# Patient Record
Sex: MALE | Race: WHITE | Employment: OTHER | URBAN - METROPOLITAN AREA
[De-identification: names, ages, dates, MRNs, and addresses within clinical notes are randomized per-mention and may not be internally consistent; named-entity substitution may affect disease eponyms.]

---

## 2017-04-07 ENCOUNTER — OFFICE VISIT (OUTPATIENT)
Dept: PEDIATRICS | Facility: CLINIC | Age: 57
End: 2017-04-07
Payer: COMMERCIAL

## 2017-04-07 VITALS
WEIGHT: 164.5 LBS | HEIGHT: 68 IN | BODY MASS INDEX: 24.93 KG/M2 | SYSTOLIC BLOOD PRESSURE: 108 MMHG | DIASTOLIC BLOOD PRESSURE: 80 MMHG | HEART RATE: 59 BPM | TEMPERATURE: 97.7 F | OXYGEN SATURATION: 98 %

## 2017-04-07 DIAGNOSIS — J30.2 SEASONAL ALLERGIC RHINITIS, UNSPECIFIED ALLERGIC RHINITIS TRIGGER: ICD-10-CM

## 2017-04-07 DIAGNOSIS — G47.09 OTHER INSOMNIA: ICD-10-CM

## 2017-04-07 DIAGNOSIS — F43.20 ADJUSTMENT DISORDER, UNSPECIFIED TYPE: Primary | ICD-10-CM

## 2017-04-07 PROCEDURE — 99214 OFFICE O/P EST MOD 30 MIN: CPT | Performed by: PHYSICIAN ASSISTANT

## 2017-04-07 RX ORDER — ESCITALOPRAM OXALATE 10 MG/1
10 TABLET ORAL DAILY
Qty: 45 TABLET | Refills: 0 | Status: SHIPPED | OUTPATIENT
Start: 2017-04-07 | End: 2017-05-16

## 2017-04-07 RX ORDER — LORATADINE 10 MG/1
10 TABLET ORAL DAILY
Qty: 90 TABLET | Refills: 3 | Status: SHIPPED | OUTPATIENT
Start: 2017-04-07 | End: 2021-06-10

## 2017-04-07 RX ORDER — FLUTICASONE PROPIONATE 50 MCG
1-2 SPRAY, SUSPENSION (ML) NASAL DAILY
Qty: 3 BOTTLE | Refills: 11 | Status: SHIPPED | OUTPATIENT
Start: 2017-04-07

## 2017-04-07 RX ORDER — ESCITALOPRAM OXALATE 20 MG/1
10 TABLET ORAL DAILY
Qty: 15 TABLET | Refills: 0 | Status: SHIPPED | OUTPATIENT
Start: 2017-04-07 | End: 2017-04-07

## 2017-04-07 ASSESSMENT — ANXIETY QUESTIONNAIRES
GAD7 TOTAL SCORE: 3
2. NOT BEING ABLE TO STOP OR CONTROL WORRYING: NOT AT ALL
7. FEELING AFRAID AS IF SOMETHING AWFUL MIGHT HAPPEN: NOT AT ALL
6. BECOMING EASILY ANNOYED OR IRRITABLE: MORE THAN HALF THE DAYS
5. BEING SO RESTLESS THAT IT IS HARD TO SIT STILL: NOT AT ALL
3. WORRYING TOO MUCH ABOUT DIFFERENT THINGS: SEVERAL DAYS
IF YOU CHECKED OFF ANY PROBLEMS ON THIS QUESTIONNAIRE, HOW DIFFICULT HAVE THESE PROBLEMS MADE IT FOR YOU TO DO YOUR WORK, TAKE CARE OF THINGS AT HOME, OR GET ALONG WITH OTHER PEOPLE: SOMEWHAT DIFFICULT
1. FEELING NERVOUS, ANXIOUS, OR ON EDGE: NOT AT ALL

## 2017-04-07 ASSESSMENT — PATIENT HEALTH QUESTIONNAIRE - PHQ9: 5. POOR APPETITE OR OVEREATING: NOT AT ALL

## 2017-04-07 NOTE — PATIENT INSTRUCTIONS
Set up follow up with Dr. Plasencia in 2-4 weeks.      Depression Affects Your Mind and Body  Everyone feels sad or  blue  from time to time for a few days or weeks. Depression is when these feelings don't go away and they interfere with daily life.  Depression is a real illness. It makes you feel sad and helpless. It gets in the way of your life and relationships. It inhibits your ability to think and act. But, with help, you can feel better again.      When I was depressed, I felt awful. I was so tired all the time I could hardly think, but at night I couldn t fall asleep. My head hurt. My stomach hurt. I didn t know what was wrong with me.    Depression affects your whole body  Brain chemicals affect your body as well as your mood. So depression may do more than just make you feel low. You may also feel bad physically. Depression can:    Cause trouble with mental tasks such as remembering, concentrating, or making decisions    Make you feel nervous and jumpy    Cause trouble sleeping. Or you may sleep too much    Change your appetite    Cause headaches, stomachaches, or other aches and pains    Drain your body of energy  Depression and other illness  It is common for people who have chronic health problems to also have depression. It can often be hard to tell which one caused the other. A person might become depressed after finding out they have a health problem. But some studies suggest being depressed may make certain health problems more likely. And some depressed people stop taking care of themselves. This may make them more likely to get sick.    0553-1039 [employer]. [address] [place] [address], [place] [address]. All rights reserved. This information is not intended as a substitute for professional medical care. Always follow your healthcare professional's instructions.        Depression  Depression is one of the most common mental health problems today. It is not just a state of unhappiness or  sadness. It is a true disease. The cause seems to be related to a decrease in chemicals that transmit signals in the brain. Having a family history of depression, alcoholism, or suicide increases the risk. Chronic illness, chronic pain, migraine headaches and high emotional stress also increase the risk.  Depression is something we tend to recognize in others, but may have a hard time seeing in ourselves. It can show in many physical and emotional ways:    Loss of appetite    Over-eating    Not being able to sleep    Sleeping too much    Tiredness not related to physical exertion    Restlessness or irritability    Slowness of movement or speech    Feeling depressed or withdrawn    Loss of interest in things you once enjoyed    Trouble concentrating, poor memory, trouble making decisions    Thoughts of harming or killing oneself, or thoughts that life is not worth living    Low self-esteem  The treatment for depression may include both medicine and psychotherapy. Antidepressants can reduce suffering and can improve the ability to function during the depressed period. Therapy can offer emotional support and help you understand emotional factors that may be causing the depression.  Home care    On-going care and support helps people manage this disease.  Find a healthcare provider and therapist who meet your needs. Seek help when you feel like you may be getting ill.    Be kind to yourself. Make it a point to do things that you enjoy (gardening, walking in nature, going to a movie, etc.). Reward yourself for small successes.    Take care of your physical body. Eat a balanced diet (low in saturated fat and high in fruits and vegetables). Exercise at least 3 times a week for 30 minutes. Even mild-moderate exercise (like brisk walking) can make you feel better.    Avoid alcohol, which can make depression worse.    Take medicine as prescribed.    Tell each of your healthcare providers about all of the prescription drugs,  over-the-counter medicines, vitamins, and supplements you take. Certain supplements interact with medicines and can result in dangerous side effects. Ask your pharmacist when you have questions about drug interactions.    Talk with your family and trusted friends about your feelings and thoughts. Ask them to help you recognize behavior changes early so you can get help and, if needed, medicine can be adjusted.  Follow-up care  Follow up with your healthcare provider, or as advised.  Call 911  Call 911 if you:    Have suicidal thoughts, a suicide plan, and the means to carry out the plan    Have trouble breathing    Are very confused    Feel very drowsy or have trouble awakening    Faint or lose consciousness    Have new chest pain that becomes more severe, lasts longer, or spreads into your shoulder, arm, neck, jaw or back  When to seek medical advice  Call your healthcare provider right away if any of these occur:    Feeling extreme depression, fear, anxiety, or anger toward yourself or others    Feeling out of control    Feeling that you may try to harm yourself or another    Hearing voices that others do not hear    Seeing things that others do not see    Can t sleep or eat for 3 days in a row    Friends or family express concern over your behavior and ask you to seek help    [id number] [employer]. [address] [place] [address]. All rights reserved. This information is not intended as a substitute for professional medical care. Always follow your healthcare professional's instructions.        Depression: Tips to Help Yourself  As your health care providers help treat your depression, you can also help yourself. Keep in mind that your illness affects you emotionally, physically, mentally, and socially. So full recovery will take time. Take care of your body and your soul, and be patient with yourself as you get better.    Be with others  Don t isolate yourself--you ll only feel worse. Try  to be with other people. And take part in fun activities when you can. Go to a movie, ballgame, [employer] service, or social event. Talk openly with people you can trust. And accept help when it s offered.  Keep your perspective    Depression can cloud your judgment. So wait until you feel better before making major life decisions, such as changing jobs, moving, or getting  or .    This illness is not your fault. Don t blame yourself for your depression.    Recovering from depression is a process. Don t be discouraged if it takes some time to feel better.    Depression saps your energy and concentration. So you won t be able to do all the things you used to do. Set small goals and do what you can.  Take care of your body  People with depression often lose the desire to take care of themselves. That only makes their problems worse. During treatment and afterward, make a point to:    Exercise. It s a great way to take care of your body. And studies have shown that exercise helps fight depression.    Avoid drugs and alcohol. These may ease the pain in the short term. But they ll only make your problems worse in the long run.    Get relief from stress. Ask your healthcare provider for relaxation exercises and techniques to help relieve stress.    Eat right. A balanced and healthy diet helps keep your body healthy.    [id number] [employer]. [address] [place] [address], [place] [address]. All rights reserved. This information is not intended as a substitute for professional medical care. Always follow your healthcare professional's instructions.        Patient Education    Escitalopram Oral solution    Escitalopram Oral tablet  Escitalopram Oral tablet  What is this medicine?  ESCITALOPRAM (es sye YURI oh pram) is used to treat depression and certain types of anxiety.  This medicine may be used for other purposes; ask your health care provider or pharmacist if you have questions.  What should I tell  my health care provider before I take this medicine?  They need to know if you have any of these conditions:    bipolar disorder or a family history of bipolar disorder    diabetes    glaucoma    heart disease    kidney or liver disease    receiving electroconvulsive therapy    seizures (convulsions)    suicidal thoughts, plans, or attempt by you or a family member    an unusual or allergic reaction to escitalopram, the related drug citalopram, other medicines, foods, dyes, or preservatives    pregnant or trying to become pregnant    breast-feeding  How should I use this medicine?  Take this medicine by mouth with a glass of water. Follow the directions on the prescription label. You can take it with or without food. If it upsets your stomach, take it with food. Take your medicine at regular intervals. Do not take it more often than directed. Do not stop taking this medicine suddenly except upon the advice of your doctor. Stopping this medicine too quickly may cause serious side effects or your condition may worsen.  A special MedGuide will be given to you by the pharmacist with each prescription and refill. Be sure to read this information carefully each time.  Talk to your pediatrician regarding the use of this medicine in children. Special care may be needed.  Overdosage: If you think you have taken too much of this medicine contact a poison control center or emergency room at once.  NOTE: This medicine is only for you. Do not share this medicine with others.  What if I miss a dose?  If you miss a dose, take it as soon as you can. If it is almost time for your next dose, take only that dose. Do not take double or extra doses.  What may interact with this medicine?  Do not take this medicine with any of the following medications:    certain medicines for fungal infections like fluconazole, itraconazole, ketoconazole, posaconazole,  voriconazole    cisapride    citalopram    dofetilide    dronedarone    linezolid    MAOIs like Carbex, Eldepryl, Marplan, Nardil, and Parnate    methylene blue (injected into a vein)    pimozide    thioridazine    ziprasidone  This medicine may also interact with the following medications:    alcohol    aspirin and aspirin-like medicines    carbamazepine    certain medicines for depression, anxiety, or psychotic disturbances    certain medicines for migraine headache like almotriptan, eletriptan, frovatriptan, naratriptan, rizatriptan, sumatriptan, zolmitriptan    certain medicines for sleep    certain medicines that treat or prevent blood clots like warfarin, enoxaparin, dalteparin    cimetidine    diuretics    fentanyl    furazolidone    isoniazid    lithium    metoprolol    NSAIDs, medicines for pain and inflammation, like ibuprofen or naproxen    other medicines that prolong the QT interval (cause an abnormal heart rhythm)    procarbazine    rasagiline    supplements like Mike's wort, kava kava, valerian    tramadol    tryptophan  This list may not describe all possible interactions. Give your health care provider a list of all the medicines, herbs, non-prescription drugs, or dietary supplements you use. Also tell them if you smoke, drink alcohol, or use illegal drugs. Some items may interact with your medicine.  What should I watch for while using this medicine?  Tell your doctor if your symptoms do not get better or if they get worse. Visit your doctor or health care professional for regular checks on your progress. Because it may take several weeks to see the full effects of this medicine, it is important to continue your treatment as prescribed by your doctor.  Patients and their families should watch out for new or worsening thoughts of suicide or depression. Also watch out for sudden changes in feelings such as feeling anxious, agitated, panicky, irritable, hostile, aggressive, impulsive, severely  restless, overly excited and hyperactive, or not being able to sleep. If this happens, especially at the beginning of treatment or after a change in dose, call your health care professional.  You may get drowsy or dizzy. Do not drive, use machinery, or do anything that needs mental alertness until you know how this medicine affects you. Do not stand or sit up quickly, especially if you are an older patient. This reduces the risk of dizzy or fainting spells. Alcohol may interfere with the effect of this medicine. Avoid alcoholic drinks.  Your mouth may get dry. Chewing sugarless gum or sucking hard candy, and drinking plenty of water may help. Contact your doctor if the problem does not go away or is severe.  What side effects may I notice from receiving this medicine?  Side effects that you should report to your doctor or health care professional as soon as possible:    allergic reactions like skin rash, itching or hives, swelling of the face, lips, or tongue    confusion    feeling faint or lightheaded, falls    fast talking and excited feelings or actions that are out of control    hallucination, loss of contact with reality    seizures    suicidal thoughts or other mood changes    unusual bleeding or bruising  Side effects that usually do not require medical attention (report to your doctor or health care professional if they continue or are bothersome):    blurred vision    changes in appetite    change in sex drive or performance    headache    increased sweating    nausea  This list may not describe all possible side effects. Call your doctor for medical advice about side effects. You may report side effects to FDA at 4-041-FDA-1596.  Where should I keep my medicine?  Keep out of reach of children.  Store at room temperature between 15 and 30 degrees C (59 and 86 degrees F). Throw away any unused medicine after the expiration date.  NOTE:This sheet is a summary. It may not cover all possible information. If  you have questions about this medicine, talk to your doctor, pharmacist, or health care provider. Copyright  2016 [employer]

## 2017-04-07 NOTE — MR AVS SNAPSHOT
After Visit Summary   4/7/2017    Miguel Friend    MRN: [id number]           Patient Information     Date Of Birth          1960        Visit Information        Provider Department      4/7/2017 12:40 PM Brian Iqbal PA-C [facility]        Today's Diagnoses     Major depressive disorder, single episode, mild (H)    -  1    Seasonal allergic rhinitis, unspecified allergic rhinitis trigger          Care Instructions      Set up follow up with Dr. Plasencia in 2-4 weeks.      Depression Affects Your Mind and Body  Everyone feels sad or  blue  from time to time for a few days or weeks. Depression is when these feelings don't go away and they interfere with daily life.  Depression is a real illness. It makes you feel sad and helpless. It gets in the way of your life and relationships. It inhibits your ability to think and act. But, with help, you can feel better again.      When I was depressed, I felt awful. I was so tired all the time I could hardly think, but at night I couldn t fall asleep. My head hurt. My stomach hurt. I didn t know what was wrong with me.    Depression affects your whole body  Brain chemicals affect your body as well as your mood. So depression may do more than just make you feel low. You may also feel bad physically. Depression can:    Cause trouble with mental tasks such as remembering, concentrating, or making decisions    Make you feel nervous and jumpy    Cause trouble sleeping. Or you may sleep too much    Change your appetite    Cause headaches, stomachaches, or other aches and pains    Drain your body of energy  Depression and other illness  It is common for people who have chronic health problems to also have depression. It can often be hard to tell which one caused the other. A person might become depressed after finding out they have a health problem. But some studies suggest being depressed may make certain health problems more likely. And  some depressed people stop taking care of themselves. This may make them more likely to get sick.    7663-3318 [employer]. [address] [place] [address], [place] [address]. All rights reserved. This information is not intended as a substitute for professional medical care. Always follow your healthcare professional's instructions.        Depression  Depression is one of the most common mental health problems today. It is not just a state of unhappiness or sadness. It is a true disease. The cause seems to be related to a decrease in chemicals that transmit signals in the brain. Having a family history of depression, alcoholism, or suicide increases the risk. Chronic illness, chronic pain, migraine headaches and high emotional stress also increase the risk.  Depression is something we tend to recognize in others, but may have a hard time seeing in ourselves. It can show in many physical and emotional ways:    Loss of appetite    Over-eating    Not being able to sleep    Sleeping too much    Tiredness not related to physical exertion    Restlessness or irritability    Slowness of movement or speech    Feeling depressed or withdrawn    Loss of interest in things you once enjoyed    Trouble concentrating, poor memory, trouble making decisions    Thoughts of harming or killing oneself, or thoughts that life is not worth living    Low self-esteem  The treatment for depression may include both medicine and psychotherapy. Antidepressants can reduce suffering and can improve the ability to function during the depressed period. Therapy can offer emotional support and help you understand emotional factors that may be causing the depression.  Home care    On-going care and support helps people manage this disease.  Find a healthcare provider and therapist who meet your needs. Seek help when you feel like you may be getting ill.    Be kind to yourself. Make it a point to do things that you enjoy (gardening, walking in  nature, going to a movie, etc.). Reward yourself for small successes.    Take care of your physical body. Eat a balanced diet (low in saturated fat and high in fruits and vegetables). Exercise at least 3 times a week for 30 minutes. Even mild-moderate exercise (like brisk walking) can make you feel better.    Avoid alcohol, which can make depression worse.    Take medicine as prescribed.    Tell each of your healthcare providers about all of the prescription drugs, over-the-counter medicines, vitamins, and supplements you take. Certain supplements interact with medicines and can result in dangerous side effects. Ask your pharmacist when you have questions about drug interactions.    Talk with your family and trusted friends about your feelings and thoughts. Ask them to help you recognize behavior changes early so you can get help and, if needed, medicine can be adjusted.  Follow-up care  Follow up with your healthcare provider, or as advised.  Call 911  Call 911 if you:    Have suicidal thoughts, a suicide plan, and the means to carry out the plan    Have trouble breathing    Are very confused    Feel very drowsy or have trouble awakening    Faint or lose consciousness    Have new chest pain that becomes more severe, lasts longer, or spreads into your shoulder, arm, neck, jaw or back  When to seek medical advice  Call your healthcare provider right away if any of these occur:    Feeling extreme depression, fear, anxiety, or anger toward yourself or others    Feeling out of control    Feeling that you may try to harm yourself or another    Hearing voices that others do not hear    Seeing things that others do not see    Can t sleep or eat for 3 days in a row    Friends or family express concern over your behavior and ask you to seek help    9805-1291 [employer]. [address] [place] [address], [place] [address]. All rights reserved. This information is not intended as a substitute for professional medical  care. Always follow your healthcare professional's instructions.        Depression: Tips to Help Yourself  As your health care providers help treat your depression, you can also help yourself. Keep in mind that your illness affects you emotionally, physically, mentally, and socially. So full recovery will take time. Take care of your body and your soul, and be patient with yourself as you get better.    Be with others  Don t isolate yourself--you ll only feel worse. Try to be with other people. And take part in fun activities when you can. Go to a movie, Soapboxgame, Spiritism service, or social event. Talk openly with people you can trust. And accept help when it s offered.  Keep your perspective    Depression can cloud your judgment. So wait until you feel better before making major life decisions, such as changing jobs, moving, or getting  or .    This illness is not your fault. Don t blame yourself for your depression.    Recovering from depression is a process. Don t be discouraged if it takes some time to feel better.    Depression saps your energy and concentration. So you won t be able to do all the things you used to do. Set small goals and do what you can.  Take care of your body  People with depression often lose the desire to take care of themselves. That only makes their problems worse. During treatment and afterward, make a point to:    Exercise. It s a great way to take care of your body. And studies have shown that exercise helps fight depression.    Avoid drugs and alcohol. These may ease the pain in the short term. But they ll only make your problems worse in the long run.    Get relief from stress. Ask your healthcare provider for relaxation exercises and techniques to help relieve stress.    Eat right. A balanced and healthy diet helps keep your body healthy.    8578-3558 [employer]. [address] [place] [address], [place] [address]. All rights reserved. This information is not  intended as a substitute for professional medical care. Always follow your healthcare professional's instructions.        Patient Education    Escitalopram Oral solution    Escitalopram Oral tablet  Escitalopram Oral tablet  What is this medicine?  ESCITALOPRAM (es sye YURI oh pram) is used to treat depression and certain types of anxiety.  This medicine may be used for other purposes; ask your health care provider or pharmacist if you have questions.  What should I tell my health care provider before I take this medicine?  They need to know if you have any of these conditions:    bipolar disorder or a family history of bipolar disorder    diabetes    glaucoma    heart disease    kidney or liver disease    receiving electroconvulsive therapy    seizures (convulsions)    suicidal thoughts, plans, or attempt by you or a family member    an unusual or allergic reaction to escitalopram, the related drug citalopram, other medicines, foods, dyes, or preservatives    pregnant or trying to become pregnant    breast-feeding  How should I use this medicine?  Take this medicine by mouth with a glass of water. Follow the directions on the prescription label. You can take it with or without food. If it upsets your stomach, take it with food. Take your medicine at regular intervals. Do not take it more often than directed. Do not stop taking this medicine suddenly except upon the advice of your doctor. Stopping this medicine too quickly may cause serious side effects or your condition may worsen.  A special MedGuide will be given to you by the pharmacist with each prescription and refill. Be sure to read this information carefully each time.  Talk to your pediatrician regarding the use of this medicine in children. Special care may be needed.  Overdosage: If you think you have taken too much of this medicine contact a poison control center or emergency room at once.  NOTE: This medicine is only for you. Do not share this medicine  with others.  What if I miss a dose?  If you miss a dose, take it as soon as you can. If it is almost time for your next dose, take only that dose. Do not take double or extra doses.  What may interact with this medicine?  Do not take this medicine with any of the following medications:    certain medicines for fungal infections like fluconazole, itraconazole, ketoconazole, posaconazole, voriconazole    cisapride    citalopram    dofetilide    dronedarone    linezolid    MAOIs like Carbex, Eldepryl, Marplan, Nardil, and Parnate    methylene blue (injected into a vein)    pimozide    thioridazine    ziprasidone  This medicine may also interact with the following medications:    alcohol    aspirin and aspirin-like medicines    carbamazepine    certain medicines for depression, anxiety, or psychotic disturbances    certain medicines for migraine headache like almotriptan, eletriptan, frovatriptan, naratriptan, rizatriptan, sumatriptan, zolmitriptan    certain medicines for sleep    certain medicines that treat or prevent blood clots like warfarin, enoxaparin, dalteparin    cimetidine    diuretics    fentanyl    furazolidone    isoniazid    lithium    metoprolol    NSAIDs, medicines for pain and inflammation, like ibuprofen or naproxen    other medicines that prolong the QT interval (cause an abnormal heart rhythm)    procarbazine    rasagiline    supplements like Mike's wort, kava kava, valerian    tramadol    tryptophan  This list may not describe all possible interactions. Give your health care provider a list of all the medicines, herbs, non-prescription drugs, or dietary supplements you use. Also tell them if you smoke, drink alcohol, or use illegal drugs. Some items may interact with your medicine.  What should I watch for while using this medicine?  Tell your doctor if your symptoms do not get better or if they get worse. Visit your doctor or health care professional for regular checks on your progress.  Because it may take several weeks to see the full effects of this medicine, it is important to continue your treatment as prescribed by your doctor.  Patients and their families should watch out for new or worsening thoughts of suicide or depression. Also watch out for sudden changes in feelings such as feeling anxious, agitated, panicky, irritable, hostile, aggressive, impulsive, severely restless, overly excited and hyperactive, or not being able to sleep. If this happens, especially at the beginning of treatment or after a change in dose, call your health care professional.  You may get drowsy or dizzy. Do not drive, use machinery, or do anything that needs mental alertness until you know how this medicine affects you. Do not stand or sit up quickly, especially if you are an older patient. This reduces the risk of dizzy or fainting spells. Alcohol may interfere with the effect of this medicine. Avoid alcoholic drinks.  Your mouth may get dry. Chewing sugarless gum or sucking hard candy, and drinking plenty of water may help. Contact your doctor if the problem does not go away or is severe.  What side effects may I notice from receiving this medicine?  Side effects that you should report to your doctor or health care professional as soon as possible:    allergic reactions like skin rash, itching or hives, swelling of the face, lips, or tongue    confusion    feeling faint or lightheaded, falls    fast talking and excited feelings or actions that are out of control    hallucination, loss of contact with reality    seizures    suicidal thoughts or other mood changes    unusual bleeding or bruising  Side effects that usually do not require medical attention (report to your doctor or health care professional if they continue or are bothersome):    blurred vision    changes in appetite    change in sex drive or performance    headache    increased sweating    nausea  This list may not describe all possible side  "effects. Call your doctor for medical advice about side effects. You may report side effects to FDA at 7-731-DWO-4217.  Where should I keep my medicine?  Keep out of reach of children.  Store at room temperature between 15 and 30 degrees C (59 and 86 degrees F). Throw away any unused medicine after the expiration date.  NOTE:This sheet is a summary. It may not cover all possible information. If you have questions about this medicine, talk to your doctor, pharmacist, or health care provider. Copyright  2016 [employer]              Follow-ups after your visit        Who to contact     If you have questions or need follow up information about today's clinic visit or your schedule please contact [facility]] directly at [phone].  Normal or non-critical lab and imaging results will be communicated to you by [employer], letter or phone within 4 business days after the clinic has received the results. If you do not hear from us within 7 days, please contact the clinic through [employer] or phone. If you have a critical or abnormal lab result, we will notify you by phone as soon as possible.  Submit refill requests through Alerts or call your pharmacy and they will forward the refill request to us. Please allow 3 business days for your refill to be completed.          Additional Information About Your Visit        Alerts Information     Alerts lets you send messages to your doctor, view your test results, renew your prescriptions, schedule appointments and more. To sign up, go to [email or web address] . Click on \"Log in\" on the left side of the screen, which will take you to the Welcome page. Then click on \"Sign up Now\" on the right side of the page.     You will be asked to enter the access code listed below, as well as some personal information. Please follow the directions to create your username and password.     Your access code is: [email or web address]  Expires: 7/6/2017  1:48 PM     Your access code will " " in 90 days. If you need help or a new code, please call your [facility] clinic or [phone].        Care EveryWhere ID     This is your Care EveryWhere ID. This could be used by other organizations to access your [facility] medical records  [id number]        Your Vitals Were     Pulse Temperature Height Pulse Oximetry BMI (Body Mass Index)       59 97.7  F (36.5  C) (Oral) 5' 8\" (1.727 m) 98% 25.01 kg/m2        Blood Pressure from Last 3 Encounters:   17 108/80   11/15/16 124/70   04/17/15 107/90    Weight from Last 3 Encounters:   17 164 lb 8 oz (74.6 kg)   11/15/16 163 lb (73.9 kg)   04/17/15 162 lb (73.5 kg)              Today, you had the following     No orders found for display         Today's Medication Changes          These changes are accurate as of: 17  1:48 PM.  If you have any questions, ask your nurse or doctor.               Start taking these medicines.        Dose/Directions    escitalopram 20 MG tablet   Commonly known as:  LEXAPRO   Used for:  Major depressive disorder, single episode, mild (H)   Started by:  Brian Iqbal PA-C        Dose:  10 mg   Take 0.5 tablets (10 mg) by mouth daily   Quantity:  15 tablet   Refills:  0       fluticasone 50 MCG/ACT spray   Commonly known as:  FLONASE   Used for:  Seasonal allergic rhinitis, unspecified allergic rhinitis trigger   Started by:  Brian Iqbal PA-C        Dose:  1-2 spray   Spray 1-2 sprays into both nostrils daily   Quantity:  3 Bottle   Refills:  11       loratadine 10 MG tablet   Commonly known as:  CLARITIN   Used for:  Seasonal allergic rhinitis, unspecified allergic rhinitis trigger   Started by:  Brian Iqbal PA-C        Dose:  10 mg   Take 1 tablet (10 mg) by mouth daily   Quantity:  90 tablet   Refills:  3            Where to get your medicines      These medications were sent to [facility] [place], [place] - [address], [place] [address]     Phone:  [phone]     escitalopram 20 MG tablet    fluticasone 50 MCG/ACT spray    loratadine 10 MG tablet                Primary Care Provider Office Phone # Fax #    Yan Plasencia [phone]       [facility] [address] [place] [address]        Thank you!     Thank you for choosing [facility]  for your care. Our goal is always to provide you with excellent care. Hearing back from our patients is one way we can continue to improve our services. Please take a few minutes to complete the written survey that you may receive in the mail after your visit with us. Thank you!             Your Updated Medication List - Protect others around you: Learn how to safely use, store and throw away your medicines at www.disposemymeds.org.          This list is accurate as of: 4/7/17  1:48 PM.  Always use your most recent med list.                   Brand Name Dispense Instructions for use    CALCIUM ACETATE-MAGNESIUM CARB PO      Take 2 capsules by mouth daily       escitalopram 20 MG tablet    LEXAPRO    15 tablet    Take 0.5 tablets (10 mg) by mouth daily       fluticasone 50 MCG/ACT spray    FLONASE    3 Bottle    Spray 1-2 sprays into both nostrils daily       loratadine 10 MG tablet    CLARITIN    90 tablet    Take 1 tablet (10 mg) by mouth daily       OMEGA-3 FISH OIL PO      Take 3 tablets by mouth daily       PROBIOTIC PO

## 2017-04-07 NOTE — PROGRESS NOTES
"  SUBJECTIVE:                                                    Miguel Friend is a 56 year old male who presents to clinic today for the following health issues:      Insomnia/Depression   Acute illness concerns: allergies- seasonal  Onset: on-going     Fever: no    Chills/Sweats: no    Headache (location?): no    Sinus Pressure:no    Conjunctivitis:  YES- itchy, watery eyes     Ear Pain: no    Rhinorrhea: YES    Congestion: YES- nasal     Sore Throat: no     Cough: no    Wheeze: no    Decreased Appetite: no    Nausea: no    Vomiting: no    Diarrhea:  no    Dysuria/Freq.: no    Fatigue/Achiness: YES- due to not sleeping     Sick/Strep Exposure: no     Therapies Tried and outcome: out of nasal spray- Rx for  Flonase and Claritin D in the past     Chronic complaint  Sleep concerns and depression.  Patient has been experiencing insomnia (early wakefulness with racing thoughts) with increasing frequency over the last 4 years.  Symptoms began with OCD diagnosis for patient's son.  Patient has noticed his symptoms have advanced in a course that runs parallel to the increasing challenges of managing his son's needs.     Patient has spoken with multiple providers, and attempted multiple therapies with little relief.  Patient is ready to follow up on SSRI/SNRI recommendation. Continues to voice opposition to talk/behavior based therapy options.     ROS:  ROS negative except as listed above      OBJECTIVE:                                                    /80  Pulse 59  Temp 97.7  F (36.5  C) (Oral)  Ht 5' 8\" (1.727 m)  Wt 164 lb 8 oz (74.6 kg)  SpO2 98%  BMI 25.01 kg/m2  Body mass index is 25.01 kg/(m^2).    PSYCH  Appearance: well groomed, cooperative, healthy male  Orientation: alert/oriented  Speech: mildly pressured, normal tone   Mood/Affect: anxious, congruent  Thought Process: appropriate  Thought Content: appropriate  Psychomotor activity: Some depressed motor - attributed to inadequate " sleep  Insight/judgment: appropriate         ASSESSMENT/PLAN:                                                      (F43.20) Adjustment disorder, unspecified type  (primary encounter diagnosis)  Comment: Mixed Anxiety and Depressive symptoms.  Racing thoughts, flat affect, loss of interest, sleep disruption, depressed motor function.  Talk/behavior therapy declined by patient.  Adverse reactions and side effects discussed.   Plan: escitalopram (LEXAPRO) 10 MG tablet  Follow up with Dr. Plasencia week of May 15      (G47.09) Other insomnia  Comment: Chronic concern, possibly secondary to adjustment disorder.  Medication adverse reactions and side effects discussed.   Plan: escitalopram (LEXAPRO) 10 MG tablet,      Follow up with Dr. Plasencia week of May 15      (J30.2) Seasonal allergic rhinitis, unspecified allergic rhinitis trigger  Plan: fluticasone (FLONASE) 50 MCG/ACT spray,         loratadine (CLARITIN) 10 MG tablet               Brian Iqbal PA-C  [facility]

## 2017-04-07 NOTE — NURSING NOTE
"Chief Complaint   Patient presents with     Allergies     seasonal        Initial /80  Pulse 59  Temp 97.7  F (36.5  C) (Oral)  Ht 5' 8\" (1.727 m)  Wt 164 lb 8 oz (74.6 kg)  SpO2 98%  BMI 25.01 kg/m2 Estimated body mass index is 25.01 kg/(m^2) as calculated from the following:    Height as of this encounter: 5' 8\" (1.727 m).    Weight as of this encounter: 164 lb 8 oz (74.6 kg).  Medication Reconciliation: complete   Laura Giron MA// April 7, 2017 12:58 PM          "

## 2017-04-08 ASSESSMENT — ANXIETY QUESTIONNAIRES: GAD7 TOTAL SCORE: 3

## 2017-04-08 ASSESSMENT — PATIENT HEALTH QUESTIONNAIRE - PHQ9: SUM OF ALL RESPONSES TO PHQ QUESTIONS 1-9: 10

## 2017-05-16 ENCOUNTER — OFFICE VISIT (OUTPATIENT)
Dept: PEDIATRICS | Facility: CLINIC | Age: 57
End: 2017-05-16
Payer: COMMERCIAL

## 2017-05-16 VITALS
BODY MASS INDEX: 25.93 KG/M2 | WEIGHT: 171.13 LBS | OXYGEN SATURATION: 97 % | HEIGHT: 68 IN | DIASTOLIC BLOOD PRESSURE: 66 MMHG | SYSTOLIC BLOOD PRESSURE: 110 MMHG | TEMPERATURE: 97.2 F | HEART RATE: 53 BPM

## 2017-05-16 DIAGNOSIS — G47.09 OTHER INSOMNIA: ICD-10-CM

## 2017-05-16 DIAGNOSIS — F43.20 ADJUSTMENT DISORDER, UNSPECIFIED TYPE: Primary | ICD-10-CM

## 2017-05-16 PROCEDURE — 99213 OFFICE O/P EST LOW 20 MIN: CPT | Performed by: INTERNAL MEDICINE

## 2017-05-16 RX ORDER — ESCITALOPRAM OXALATE 10 MG/1
10 TABLET ORAL DAILY
Qty: 90 TABLET | Refills: 1 | Status: SHIPPED | OUTPATIENT
Start: 2017-05-16 | End: 2019-06-25

## 2017-05-16 NOTE — MR AVS SNAPSHOT
After Visit Summary   5/16/2017    Miguel Friend    MRN: [id number]           Patient Information     Date Of Birth          1960        Visit Information        Provider Department      5/16/2017 2:40 PM Yan Plasencia MD [facility]        Today's Diagnoses     Adjustment disorder, unspecified type    -  1    Other insomnia          Care Instructions                   Insomnia  What is insomnia?   Having insomnia means you often have trouble falling or staying asleep or going back to sleep if you awaken. Insomnia can be either a short-term or a long-term problem. Insomnia affects 1 in 3 adults every year in the [place].     Causes of insomnia include:   stress such as a big deadline at work, a financial problem, or a sick family member   being overweight   depression, anxiety, or other mental health problems   medical problems such as sleep apnea or hyperthyroidism   restless leg syndrome (muscles in your lower legs twitch or tense up during sleep).   use of caffeine or other stimulants   use of alcohol, other depressants, or sedatives, which can relax you but lead to shallow sleep that starts and stops, especially if you use these drugs for a long time   medicines, such as those used to treat asthma   pain and other discomfort caused by an illness such as arthritis   shortness of breath caused by chronic obstructive pulmonary disease (COPD) or heart failure   poor sleep habits, including going to bed at different times or in a noisy environment, or eating or working in bed before sleeping   changes in sleep patterns because of different work hours or travel (jet lag)   Insomnia may be temporary (called situational insomnia) or ongoing (chronic insomnia).   Situational insomnia occurs with a stressful event. It is often caused by noise, pain, worry, or family, work, financial, or school problems. It lasts 3 weeks or less. This kind of insomnia generally goes away when the  stressful event is over or resolved.   Chronic insomnia can be caused by irregular sleep-wake patterns resulting from shift work, drug dependency (including long-term use of sleeping pills or alcohol), stress, illness, or mental health problems such as anxiety or depression. It lasts longer than 3 weeks and requires treatment of the underlying problem.   What are the symptoms?   Symptoms include:   trouble falling asleep (taking longer than 45 minutes)   awakening often in the night   waking up early in the morning and being unable to go back to sleep   not feeling rested in the morning or feeling tired during the day   restlessness or anxiety as bedtime approaches   How is it diagnosed?   Your healthcare provider will ask you about:   your sleep patterns   use of caffeine, alcohol, medicine, and other drugs   eating and exercise habits   your mental and physical condition   your medical and mental health history, and your family's history   your job and travel patterns   Your healthcare provider may also ask your spouse, bed partner, or other family members about your sleep habits. After talking with you, your healthcare provider may give you a physical exam. A blood sample may be taken for lab tests.   Your healthcare provider may ask you to take notes each morning about:   how long you were in bed   how much time you think you actually slept   how many times and what times you woke up   what time you got up in the morning   your thoughts about the quality of your sleep   recent stresses   Your healthcare provider may suggest that you sleep overnight in a sleep center. At the sleep center you may have a continuous, all-night recording of your breathing, eye movements, muscle tone, blood oxygen levels, heart rate and rhythm, and brain waves.   How is it treated?   If a medical problem is causing your insomnia, your provider will treat you for it. If drug or alcohol abuse is the cause of your insomnia, you will need  to stop using these substances. If you have chronic insomnia, it must be treated with management of the underlying problem.   In some cases of temporary insomnia, your healthcare provider may prescribe medicine to help you sleep until the stressful event is over or resolved. Counseling may also help you deal with psychological problems or reduce stress that may cause or contribute to your insomnia.   Some sleeping medicine can be addictive. Your healthcare provider will work with you to choose the right medicine for short-term or long-term use.   Your healthcare provider may recommend relaxation techniques, changes in diet, cutting out caffeine, and a healthy lifestyle that includes exercise. Your provider also will probably discuss good sleep habits and a regular sleep routine.   How long will the effects last?   Often insomnia lasts for just a few nights. If you cannot sleep almost every night for 2 weeks, tell your healthcare provider. Insomnia that lasts this long usually continues until the cause is identified and treated.   How can I take care of myself?   Tell your healthcare provider if the treatment plan doesn't help.   Tell your provider if you have side effects from medicine prescribed for the insomnia.   Follow your provider's instructions for follow-up visits.   How can I help prevent insomnia?   Practice good sleep hygiene:   Use the bedroom only for sleep and sex, not for reading or watching TV.   Keep the room dark and the temperature comfortable.   Consider listening to white noise, such as a fan blowing.   Keep active during the day. Exercise and get some fresh air.   Stick to a routine of going to bed and getting up at the same time each day.   Limit daytime naps to no more than 1 hour each day.   Avoid caffeine late in the day.   If you eat late at night, keep it light.   Keep a healthy weight. Being overweight may cause tiredness during the day and may worsen sleep apnea.   Stop smoking.   Try  "to reduce stress in your life by changing the things that cause stress.   Keep a \"to do\" journal. Before you go to bed, write down all the things you are worrying about. Then write down what you can do tomorrow. Hakan the other things as things to do later in the week. This will help clear your mind of worry.   Arrange your medicine schedule with your provider so that you take any drugs that might make you sleepy in the evening and drugs that may interfere with sleep during the day.   Avoid daily use of sleep medicines. You may become dependent on them or build up your tolerance to them so that they no longer work as well. Most sleeping pills should not be used for more than 2 weeks in a row.   Try not to focus on falling asleep. For example, don't keep checking the clock and worry about why you are not asleep yet. If you are awake for more than 30 minutes, leave the bed and do not go back to bed until you feel ready to sleep.         Published by [employer].  This content is reviewed periodically and is subject to change as new health information becomes available. The information is intended to inform and educate and is not a replacement for medical evaluation, advice, diagnosis or treatment by a healthcare professional.   Developed by Diana Chakraborty RN, [place], and [employer].   ? 2010 [employer] and/or its affiliates. All Rights Reserved.   Copyright   Clinical Reference Systems 2011  Adult Health Advisor              Follow-ups after your visit        Who to contact     If you have questions or need follow up information about today's clinic visit or your schedule please contact [facility] directly at [phone].  Normal or non-critical lab and imaging results will be communicated to you by MyChart, letter or phone within 4 business days after the clinic has received the results. If you do not hear from us within 7 days, please contact the clinic through MyChart or phone. If you have a critical " "or abnormal lab result, we will notify you by phone as soon as possible.  Submit refill requests through [employer] or call your pharmacy and they will forward the refill request to us. Please allow 3 business days for your refill to be completed.          Additional Information About Your Visit        [employer] Information     [employer] lets you send messages to your doctor, view your test results, renew your prescriptions, schedule appointments and more. To sign up, go to [email or web address] . Click on \"Log in\" on the left side of the screen, which will take you to the Welcome page. Then click on \"Sign up Now\" on the right side of the page.     You will be asked to enter the access code listed below, as well as some personal information. Please follow the directions to create your username and password.     Your access code is: [email or web address]  Expires: 2017  1:48 PM     Your access code will  in 90 days. If you need help or a new code, please call your [place] clinic or [phone].        Care EveryWhere ID     This is your Care EveryWhere ID. This could be used by other organizations to access your [place] medical records  [id number]        Your Vitals Were     Pulse Temperature Height Pulse Oximetry BMI (Body Mass Index)       53 97.2  F (36.2  C) (Tympanic) 5' 8\" (1.727 m) 97% 26.02 kg/m2        Blood Pressure from Last 3 Encounters:   17 110/66   17 108/80   11/15/16 124/70    Weight from Last 3 Encounters:   17 171 lb 2 oz (77.6 kg)   17 164 lb 8 oz (74.6 kg)   11/15/16 163 lb (73.9 kg)              Today, you had the following     No orders found for display         Where to get your medicines      These medications were sent to [facility] [address]   [address]  [address] [address]     Phone:  [phone]     escitalopram 10 MG tablet          Primary Care Provider Office Phone # Fax #    Yan LORENZ" MD Luis Angel [phone]       [facility] [address] [place] [address]        Thank you!     Thank you for choosing [facility]  for your care. Our goal is always to provide you with excellent care. Hearing back from our patients is one way we can continue to improve our services. Please take a few minutes to complete the written survey that you may receive in the mail after your visit with us. Thank you!             Your Updated Medication List - Protect others around you: Learn how to safely use, store and throw away your medicines at www.disposemymeds.org.          This list is accurate as of: 5/16/17  3:18 PM.  Always use your most recent med list.                   Brand Name Dispense Instructions for use    CALCIUM ACETATE-MAGNESIUM CARB PO      Take 2 capsules by mouth daily       escitalopram 10 MG tablet    LEXAPRO    90 tablet    Take 1 tablet (10 mg) by mouth daily       fluticasone 50 MCG/ACT spray    FLONASE    3 Bottle    Spray 1-2 sprays into both nostrils daily       loratadine 10 MG tablet    CLARITIN    90 tablet    Take 1 tablet (10 mg) by mouth daily       OMEGA-3 FISH OIL PO      Take 3 tablets by mouth daily       PROBIOTIC PO

## 2017-05-16 NOTE — NURSING NOTE
"Chief Complaint   Patient presents with     Allergies     f/u on allergies sx       Initial /66 (BP Location: Right arm, Patient Position: Chair, Cuff Size: Adult Regular)  Pulse 53  Temp 97.2  F (36.2  C) (Tympanic)  Ht 5' 8\" (1.727 m)  Wt 171 lb 2 oz (77.6 kg)  SpO2 97%  BMI 26.02 kg/m2 Estimated body mass index is 26.02 kg/(m^2) as calculated from the following:    Height as of this encounter: 5' 8\" (1.727 m).    Weight as of this encounter: 171 lb 2 oz (77.6 kg).  Medication Reconciliation: complete   Barbra Clarke CMA    "

## 2017-05-16 NOTE — PATIENT INSTRUCTIONS
Insomnia  What is insomnia?   Having insomnia means you often have trouble falling or staying asleep or going back to sleep if you awaken. Insomnia can be either a short-term or a long-term problem. Insomnia affects 1 in 3 adults every year in the [place].     Causes of insomnia include:   stress such as a big deadline at work, a financial problem, or a sick family member   being overweight   depression, anxiety, or other mental health problems   medical problems such as sleep apnea or hyperthyroidism   restless leg syndrome (muscles in your lower legs twitch or tense up during sleep).   use of caffeine or other stimulants   use of alcohol, other depressants, or sedatives, which can relax you but lead to shallow sleep that starts and stops, especially if you use these drugs for a long time   medicines, such as those used to treat asthma   pain and other discomfort caused by an illness such as arthritis   shortness of breath caused by chronic obstructive pulmonary disease (COPD) or heart failure   poor sleep habits, including going to bed at different times or in a noisy environment, or eating or working in bed before sleeping   changes in sleep patterns because of different work hours or travel (jet lag)   Insomnia may be temporary (called situational insomnia) or ongoing (chronic insomnia).   Situational insomnia occurs with a stressful event. It is often caused by noise, pain, worry, or family, work, financial, or school problems. It lasts 3 weeks or less. This kind of insomnia generally goes away when the stressful event is over or resolved.   Chronic insomnia can be caused by irregular sleep-wake patterns resulting from shift work, drug dependency (including long-term use of sleeping pills or alcohol), stress, illness, or mental health problems such as anxiety or depression. It lasts longer than 3 weeks and requires treatment of the underlying problem.   What are the symptoms?   Symptoms  include:   trouble falling asleep (taking longer than 45 minutes)   awakening often in the night   waking up early in the morning and being unable to go back to sleep   not feeling rested in the morning or feeling tired during the day   restlessness or anxiety as bedtime approaches   How is it diagnosed?   Your healthcare provider will ask you about:   your sleep patterns   use of caffeine, alcohol, medicine, and other drugs   eating and exercise habits   your mental and physical condition   your medical and mental health history, and your family's history   your job and travel patterns   Your healthcare provider may also ask your spouse, bed partner, or other family members about your sleep habits. After talking with you, your healthcare provider may give you a physical exam. A blood sample may be taken for lab tests.   Your healthcare provider may ask you to take notes each morning about:   how long you were in bed   how much time you think you actually slept   how many times and what times you woke up   what time you got up in the morning   your thoughts about the quality of your sleep   recent stresses   Your healthcare provider may suggest that you sleep overnight in a sleep center. At the sleep center you may have a continuous, all-night recording of your breathing, eye movements, muscle tone, blood oxygen levels, heart rate and rhythm, and brain waves.   How is it treated?   If a medical problem is causing your insomnia, your provider will treat you for it. If drug or alcohol abuse is the cause of your insomnia, you will need to stop using these substances. If you have chronic insomnia, it must be treated with management of the underlying problem.   In some cases of temporary insomnia, your healthcare provider may prescribe medicine to help you sleep until the stressful event is over or resolved. Counseling may also help you deal with psychological problems or reduce stress that may cause or contribute to  "your insomnia.   Some sleeping medicine can be addictive. Your healthcare provider will work with you to choose the right medicine for short-term or long-term use.   Your healthcare provider may recommend relaxation techniques, changes in diet, cutting out caffeine, and a healthy lifestyle that includes exercise. Your provider also will probably discuss good sleep habits and a regular sleep routine.   How long will the effects last?   Often insomnia lasts for just a few nights. If you cannot sleep almost every night for 2 weeks, tell your healthcare provider. Insomnia that lasts this long usually continues until the cause is identified and treated.   How can I take care of myself?   Tell your healthcare provider if the treatment plan doesn't help.   Tell your provider if you have side effects from medicine prescribed for the insomnia.   Follow your provider's instructions for follow-up visits.   How can I help prevent insomnia?   Practice good sleep hygiene:   Use the bedroom only for sleep and sex, not for reading or watching TV.   Keep the room dark and the temperature comfortable.   Consider listening to white noise, such as a fan blowing.   Keep active during the day. Exercise and get some fresh air.   Stick to a routine of going to bed and getting up at the same time each day.   Limit daytime naps to no more than 1 hour each day.   Avoid caffeine late in the day.   If you eat late at night, keep it light.   Keep a healthy weight. Being overweight may cause tiredness during the day and may worsen sleep apnea.   Stop smoking.   Try to reduce stress in your life by changing the things that cause stress.   Keep a \"to do\" journal. Before you go to bed, write down all the things you are worrying about. Then write down what you can do tomorrow. Hakan the other things as things to do later in the week. This will help clear your mind of worry.   Arrange your medicine schedule with your provider so that you take any drugs " that might make you sleepy in the evening and drugs that may interfere with sleep during the day.   Avoid daily use of sleep medicines. You may become dependent on them or build up your tolerance to them so that they no longer work as well. Most sleeping pills should not be used for more than 2 weeks in a row.   Try not to focus on falling asleep. For example, don't keep checking the clock and worry about why you are not asleep yet. If you are awake for more than 30 minutes, leave the bed and do not go back to bed until you feel ready to sleep.         Published by [employer].  This content is reviewed periodically and is subject to change as new health information becomes available. The information is intended to inform and educate and is not a replacement for medical evaluation, advice, diagnosis or treatment by a healthcare professional.   Developed by Diana Chakraborty RN, [place], and [employer].   ? 2010 [facility] and/or its affiliates. All Rights Reserved.   Copyright   Clinical Reference Systems 2011  Adult Health Advisor

## 2017-05-16 NOTE — PROGRESS NOTES
SUBJECTIVE:                                                    Miguel Friend is a 56 year old male who presents to clinic today for the following health issues:    Follow up on meds refills, mood has been good and patient happy with the improvment. started on SSRI last month and seems to be helping. no side effects of medications noted. some seasonal allergies but using over-the-counter medications and managed well. appetie is good. No other concerns or complaints today.       Problem list and histories reviewed & adjusted, as indicated.  Additional history: as documented    Reviewed and updated as needed this visit by clinical staff  Tobacco  Allergies  Meds  Med Hx       Reviewed and updated as needed this visit by Provider    Problem list and histories reviewed & adjusted, as indicated.  Additional history: as documented    Patient Active Problem List    Diagnosis Date Noted     Left knee pain 08/02/2016     Priority: Medium     Advanced directives, counseling/discussion 04/21/2015     Priority: Medium     4/21/15: Advance Care Planning invitation mailed to patient/TE         Umbilical hernia 04/13/2010     Priority: Medium     CARDIOVASCULAR SCREENING; LDL GOAL LESS THAN 160 02/10/2010     Priority: Medium     Social History   Substance Use Topics     Smoking status: Never Smoker     Smokeless tobacco: Never Used     Alcohol use Yes      Comment: 2 beers per week     Family History   Problem Relation Age of Onset     C.A.D. No family hx of      CEREBROVASCULAR DISEASE No family hx of      Hypertension No family hx of      DIABETES No family hx of      CANCER Maternal Grandfather      Thyroid Disease No family hx of      CANCER Mother      sarcoma around aorta      CANCER Maternal Grandmother      CANCER Maternal Aunt      CANCER Maternal Uncle        ROS:  A complete 5 point review of systems was taken and negative except for those noted in the subjective/HPI section(s) above     Problem list, Medication  "list, Allergies, and Medical/Social/Surgical histories reviewed in [facility] and updated as appropriate.    OBJECTIVE:                                                    /66 (BP Location: Right arm, Patient Position: Chair, Cuff Size: Adult Regular)  Pulse 53  Temp 97.2  F (36.2  C) (Tympanic)  Ht 5' 8\" (1.727 m)  Wt 171 lb 2 oz (77.6 kg)  SpO2 97%  BMI 26.02 kg/m2   Constitutional: healthy, alert and no distress  Psychiatric: mentation appears normal and affect normal/bright  Presentation- normal:Abstract reasoning  Attention and concentration  Coherency and relevance of thought  Dress, grooming, personal hygiene  Facial expression  Information  Judgment  Memory  Mood  Orientation  Perceptions  Posture and motor behavoir  Speech  Thought content  Vocabulary,abnormal:none    MARIA LUZ-7 SCORE 4/7/2017   Total Score 3       PHQ-9 SCORE 4/7/2017   Total Score 10            ASSESSMENT/PLAN:                                                        ICD-10-CM    1. Adjustment disorder, unspecified type F43.20 escitalopram (LEXAPRO) 10 MG tablet   2. Other insomnia G47.09 escitalopram (LEXAPRO) 10 MG tablet   discussed with patient (or patient's parents/caregiver) pathophysiology of condition and treatment options.  doing better on the SSRI> will continue and get repeat PHQ9 in another month. New medication(s) indication(s), adverse effects, risks and benefits discussed. Reviewed concept of depression as function of biochemical imbalance of neurotransmitters/rationale for treatment.  Risks and benefits of medication(s) reviewed with patient.  Questions answered.  Counseling advised  Followup appointment in 1 month(s)  Patient instructed to call for significant side effects medications or problems       Estimated body mass index is 26.02 kg/(m^2) as calculated from the following:    Height as of this encounter: 5' 8\" (1.727 m).    Weight as of this encounter: 171 lb 2 oz (77.6 kg).      Return to clinic as needed or if " symptoms persist, change, worsen or if any new symptoms develop.    Yan Plasencia M.D.  Internal Medicine-Pediatrics

## 2019-06-25 ENCOUNTER — OFFICE VISIT (OUTPATIENT)
Dept: PEDIATRICS | Facility: CLINIC | Age: 59
End: 2019-06-25
Payer: COMMERCIAL

## 2019-06-25 VITALS
BODY MASS INDEX: 26.06 KG/M2 | TEMPERATURE: 99 F | HEIGHT: 67 IN | DIASTOLIC BLOOD PRESSURE: 60 MMHG | WEIGHT: 166 LBS | HEART RATE: 64 BPM | SYSTOLIC BLOOD PRESSURE: 112 MMHG | RESPIRATION RATE: 20 BRPM

## 2019-06-25 DIAGNOSIS — Z12.5 PROSTATE CANCER SCREENING: ICD-10-CM

## 2019-06-25 DIAGNOSIS — Z00.00 ROUTINE GENERAL MEDICAL EXAMINATION AT A HEALTH CARE FACILITY: ICD-10-CM

## 2019-06-25 DIAGNOSIS — Z13.6 CARDIOVASCULAR SCREENING; LDL GOAL LESS THAN 160: ICD-10-CM

## 2019-06-25 DIAGNOSIS — Z29.89 ALTITUDE SICKNESS PREVENTATIVE MEASURES: ICD-10-CM

## 2019-06-25 DIAGNOSIS — Z00.00 ENCOUNTER FOR ROUTINE ADULT HEALTH EXAMINATION WITHOUT ABNORMAL FINDINGS: Primary | ICD-10-CM

## 2019-06-25 PROCEDURE — 99396 PREV VISIT EST AGE 40-64: CPT | Performed by: INTERNAL MEDICINE

## 2019-06-25 RX ORDER — ACETAZOLAMIDE 125 MG/1
TABLET ORAL
Qty: 60 TABLET | Refills: 0 | Status: SHIPPED | OUTPATIENT
Start: 2019-06-25 | End: 2022-07-27

## 2019-06-25 ASSESSMENT — MIFFLIN-ST. JEOR: SCORE: 1526.6

## 2019-06-25 ASSESSMENT — PATIENT HEALTH QUESTIONNAIRE - PHQ9: SUM OF ALL RESPONSES TO PHQ QUESTIONS 1-9: 1

## 2019-06-25 NOTE — PROGRESS NOTES
"SUBJECTIVE:   CC: Miguel Friend is an 58 year old male who presents for preventative health visit.     HPI  {Add if <65 person on Medicare  - Required Questions (Optional):427406}  {Outside tests to abstract? :431876}    {additional problems to add (Optional):644643}    Today's PHQ-2 Score:   PHQ-2 ( 1999 Pfizer) 11/15/2016   Q1: Little interest or pleasure in doing things 0   Q2: Feeling down, depressed or hopeless 0   PHQ-2 Score 0       Abuse: Current or Past(Physical, Sexual or Emotional)- { :315224}  Do you feel safe in your environment? { :369436}    Social History     Tobacco Use     Smoking status: Never Smoker     Smokeless tobacco: Never Used   Substance Use Topics     Alcohol use: Yes     Comment: 2 beers per week     {Rooming Staff- Complete this question if Prescreen response is not shown below for today's visit. If you drink alcohol do you typically have >3 drinks per day or >7 drinks per week? (Optional):030142}    Alcohol Use 8/29/2013   Prescreen: >3 drinks/day or >7 drinks/week? The patient does not drink >3 drinks per day nor >7 drinks per week.   {add AUDIT responses (Optional) (A score of 7 for adult men is an indication of hazardous drinking; a score of 8 or more is an indication of an alcohol use disorder.  A score of 7 or more for adult women is an indication of hazardous drinking or an alchohol use disorder):191364}    Last PSA:   PSA   Date Value Ref Range Status   08/27/2013 0.83 0 - 4 ug/L Final       Reviewed orders with patient. Reviewed health maintenance and updated orders accordingly - { :001390::\"Yes\"}  {Chronicprobdata (optional):386657}    Reviewed and updated as needed this visit by clinical staff         Reviewed and updated as needed this visit by Provider        {HISTORY OPTIONS (Optional):512478}    Review of Systems  {MALE ROS (Optional):757113::\"CONSTITUTIONAL: NEGATIVE for fever, chills, change in weight\",\"INTEGUMENTARY/SKIN: NEGATIVE for worrisome rashes, moles or " "lesions\",\"EYES: NEGATIVE for vision changes or irritation\",\"ENT: NEGATIVE for ear, mouth and throat problems\",\"RESP: NEGATIVE for significant cough or SOB\",\"CV: NEGATIVE for chest pain, palpitations or peripheral edema\",\"GI: NEGATIVE for nausea, abdominal pain, heartburn, or change in bowel habits\",\" male: negative for dysuria, hematuria, decreased urinary stream, erectile dysfunction, urethral discharge\",\"MUSCULOSKELETAL: NEGATIVE for significant arthralgias or myalgia\",\"NEURO: NEGATIVE for weakness, dizziness or paresthesias\",\"PSYCHIATRIC: NEGATIVE for changes in mood or affect\"}    OBJECTIVE:   There were no vitals taken for this visit.    Physical Exam  {Exam Choices (Optional):907896}    {Diagnostic Test Results (Optional):345075::\"Diagnostic Test Results:\",\"Labs reviewed in Epic\"}    ASSESSMENT/PLAN:   {Diag Picklist:506902}    COUNSELING:   {MALE COUNSELING MESSAGES:289100::\"Reviewed preventive health counseling, as reflected in patient instructions\"}    Estimated body mass index is 26.02 kg/m  as calculated from the following:    Height as of 5/16/17: 1.727 m (5' 8\").    Weight as of 5/16/17: 77.6 kg (171 lb 2 oz).     {Weight Management Plan (ACO) Complete if BMI is abnormal-  Ages 18-64  BMI >24.9.  Age 65+ with BMI <23 or >30 (Optional):179494}     reports that he has never smoked. He has never used smokeless tobacco.  {Tobacco Cessation -- Complete if patient is a smoker (Optional):976362}    Counseling Resources:  ATP IV Guidelines  Pooled Cohorts Equation Calculator  FRAX Risk Assessment  ICSI Preventive Guidelines  Dietary Guidelines for Americans, 2010  USDA's MyPlate  ASA Prophylaxis  Lung CA Screening    Yan Plasencia MD  [facility]  "

## 2019-06-25 NOTE — PROGRESS NOTES
SUBJECTIVE:   CC: Miguel Friend is an 58 year old male who presents for preventive health visit.     Healthy Habits:    Do you get at least three servings of calcium containing foods daily (dairy, green leafy vegetables, etc.)? no, taking calcium and/or vitamin D supplement: yes -     Amount of exercise or daily activities, outside of work: 5-7 day(s) per week    Problems taking medications regularly No    Medication side effects: No    Have you had an eye exam in the past two years? yes    Do you see a dentist twice per year? yes    Do you have sleep apnea, excessive snoring or daytime drowsiness?no      Patient here for RHM exam, fells well. due for labs. Would like acetazolamide prescription as he plans to travel to [place] [facility] in [place] and [place] for the net year as he is going on a one to two year trip. No other concerns or complaints today.     Today's PHQ-2 Score: 0  PHQ-2 ( 1999 Pfizer) 6/25/2019 11/15/2016   Q1: Little interest or pleasure in doing things 0 0   Q2: Feeling down, depressed or hopeless 0 0   PHQ-2 Score 0 0       Abuse: Current or Past(Physical, Sexual or Emotional)- No  Do you feel safe in your environment? Yes    Social History     Tobacco Use     Smoking status: Never Smoker     Smokeless tobacco: Never Used   Substance Use Topics     Alcohol use: Yes     Comment: 2 beers per week     If you drink alcohol do you typically have >3 drinks per day or >7 drinks per week? No                      Last PSA:   PSA   Date Value Ref Range Status   08/27/2013 0.83 0 - 4 ug/L Final       Reviewed orders with patient. Reviewed health maintenance and updated orders accordingly - Yes  BP Readings from Last 3 Encounters:   06/25/19 112/60   05/16/17 110/66   04/07/17 108/80    Wt Readings from Last 3 Encounters:   06/25/19 75.3 kg (166 lb)   05/16/17 77.6 kg (171 lb 2 oz)   04/07/17 74.6 kg (164 lb 8 oz)                    Reviewed and updated as needed this visit by clinical staff  Tobacco   "Allergies  Med Hx  Surg Hx  Fam Hx  Soc Hx        Reviewed and updated as needed this visit by Provider            ROS:  CONSTITUTIONAL: NEGATIVE for fever, chills, change in weight  INTEGUMENTARY/SKIN: NEGATIVE for worrisome rashes, moles or lesions  EYES: NEGATIVE for vision changes or irritation  ENT: NEGATIVE for ear, mouth and throat problems  RESP: NEGATIVE for significant cough or SOB  CV: NEGATIVE for chest pain, palpitations or peripheral edema  GI: NEGATIVE for nausea, abdominal pain, heartburn, or change in bowel habits   male: negative for dysuria, hematuria, decreased urinary stream, erectile dysfunction, urethral discharge  MUSCULOSKELETAL: NEGATIVE for significant arthralgias or myalgia  NEURO: NEGATIVE for weakness, dizziness or paresthesias  PSYCHIATRIC: NEGATIVE for changes in mood or affect    OBJECTIVE:   /60   Pulse 64   Temp 99  F (37.2  C) (Oral)   Resp 20   Ht 1.702 m (5' 7\")   Wt 75.3 kg (166 lb)   BMI 26.00 kg/m    EXAM:  GENERAL: healthy, alert and no distress  EYES: Eyes grossly normal to inspection, PERRL and conjunctivae and sclerae normal  HENT: ear canals and TM's normal, nose and mouth without ulcers or lesions  NECK: no adenopathy, no asymmetry, masses, or scars and thyroid normal to palpation  RESP: lungs clear to auscultation - no rales, rhonchi or wheezes  CV: regular rate and rhythm, normal S1 S2, no S3 or S4, no murmur, click or rub, no peripheral edema and peripheral pulses strong  ABDOMEN: soft, nontender, no hepatosplenomegaly, no masses and bowel sounds normal  MS: no gross musculoskeletal defects noted, no edema  SKIN: no suspicious lesions or rashes  NEURO: Normal strength and tone, mentation intact and speech normal  PSYCH: mentation appears normal, affect normal/bright      ASSESSMENT/PLAN:   1. Routine general medical examination at a health care facility  d/w pt preventative care measures including seat belt use, bike helmet, moderation of EtOH, " "avoiding tobacco, avoiding excessive sun exposure/sunscreen, wt management or wt loss if BMI > 30, need to screen for lipid disorders, mood disorders, CAD risk factors, etc. Also discussed accident prevention and future RHM schedule.   - Lipid panel reflex to direct LDL Fasting; Future  - PSA, screen; Future  - Comprehensive metabolic panel (BMP + Alb, Alk Phos, ALT, AST, Total. Bili, TP); Future    2. Prostate cancer screening  - PSA, screen; Future    3. CARDIOVASCULAR SCREENING; LDL GOAL LESS THAN 160  - Lipid panel reflex to direct LDL Fasting; Future  - Comprehensive metabolic panel (BMP + Alb, Alk Phos, ALT, AST, Total. Bili, TP); Future    4. Encounter for routine adult health examination without abnormal findings  - Lipid panel reflex to direct LDL Fasting; Future  - PSA, screen; Future  - Comprehensive metabolic panel (BMP + Alb, Alk Phos, ALT, AST, Total. Bili, TP); Future    5. Altitude sickness preventative measures  discussed with patient (or patient's parents/caregiver) pathophysiology of condition and treatment options.  has history of this in the past, took prescription without side effects .New medication(s) indication(s), adverse effects, risks and benefits discussed. Patient verbalized understanding and is agreeable to this plan.   - acetaZOLAMIDE (DIAMOX) 125 MG tablet; 125 mg twice daily; beginning either the day before (preferred) or on the day of ascent; may be discontinued after staying at the same elevation for 2 to 3 days or if descent initiated  Dispense: 60 tablet; Refill: 0    COUNSELING:  Reviewed preventive health counseling, as reflected in patient instructions    Estimated body mass index is 26.02 kg/m  as calculated from the following:    Height as of 5/16/17: 1.727 m (5' 8\").    Weight as of 5/16/17: 77.6 kg (171 lb 2 oz).         reports that he has never smoked. He has never used smokeless tobacco.      Counseling Resources:  ATP IV Guidelines  Pooled Cohorts Equation " Calculator  FRAX Risk Assessment  ICSI Preventive Guidelines  Dietary Guidelines for Americans, 2010  USDA's MyPlate  ASA Prophylaxis  Lung CA Screening    I have discussed with patient the risks, benefits, medications, treatment options and modalities.   I have instructed the patient to call or schedule a follow-up appointment if any problems or failure to improve.       Yan Plasencia MD  [facility]

## 2019-06-27 DIAGNOSIS — Z00.00 ROUTINE GENERAL MEDICAL EXAMINATION AT A HEALTH CARE FACILITY: ICD-10-CM

## 2019-06-27 DIAGNOSIS — Z00.00 ENCOUNTER FOR ROUTINE ADULT HEALTH EXAMINATION WITHOUT ABNORMAL FINDINGS: ICD-10-CM

## 2019-06-27 DIAGNOSIS — Z12.5 PROSTATE CANCER SCREENING: ICD-10-CM

## 2019-06-27 DIAGNOSIS — Z13.6 CARDIOVASCULAR SCREENING; LDL GOAL LESS THAN 160: ICD-10-CM

## 2019-06-27 PROCEDURE — 80053 COMPREHEN METABOLIC PANEL: CPT | Performed by: INTERNAL MEDICINE

## 2019-06-27 PROCEDURE — G0103 PSA SCREENING: HCPCS | Performed by: INTERNAL MEDICINE

## 2019-06-27 PROCEDURE — 80061 LIPID PANEL: CPT | Performed by: INTERNAL MEDICINE

## 2019-06-27 PROCEDURE — 36415 COLL VENOUS BLD VENIPUNCTURE: CPT | Performed by: INTERNAL MEDICINE

## 2019-06-28 LAB
ALBUMIN SERPL-MCNC: 3.9 G/DL (ref 3.4–5)
ALP SERPL-CCNC: 39 U/L (ref 40–150)
ALT SERPL W P-5'-P-CCNC: 51 U/L (ref 0–70)
ANION GAP SERPL CALCULATED.3IONS-SCNC: 7 MMOL/L (ref 3–14)
AST SERPL W P-5'-P-CCNC: 38 U/L (ref 0–45)
BILIRUB SERPL-MCNC: 0.7 MG/DL (ref 0.2–1.3)
BUN SERPL-MCNC: 13 MG/DL (ref 7–30)
CALCIUM SERPL-MCNC: 8.8 MG/DL (ref 8.5–10.1)
CHLORIDE SERPL-SCNC: 109 MMOL/L (ref 94–109)
CHOLEST SERPL-MCNC: 212 MG/DL
CO2 SERPL-SCNC: 25 MMOL/L (ref 20–32)
CREAT SERPL-MCNC: 0.98 MG/DL (ref 0.66–1.25)
GFR SERPL CREATININE-BSD FRML MDRD: 84 ML/MIN/{1.73_M2}
GLUCOSE SERPL-MCNC: 81 MG/DL (ref 70–99)
HDLC SERPL-MCNC: 65 MG/DL
LDLC SERPL CALC-MCNC: 126 MG/DL
NONHDLC SERPL-MCNC: 147 MG/DL
POTASSIUM SERPL-SCNC: 4.1 MMOL/L (ref 3.4–5.3)
PROT SERPL-MCNC: 7 G/DL (ref 6.8–8.8)
PSA SERPL-ACNC: 0.78 UG/L (ref 0–4)
SODIUM SERPL-SCNC: 141 MMOL/L (ref 133–144)
TRIGL SERPL-MCNC: 107 MG/DL

## 2019-09-06 ENCOUNTER — TRANSFERRED RECORDS (OUTPATIENT)
Dept: HEALTH INFORMATION MANAGEMENT | Facility: CLINIC | Age: 59
End: 2019-09-06

## 2019-09-30 ENCOUNTER — HEALTH MAINTENANCE LETTER (OUTPATIENT)
Age: 59
End: 2019-09-30

## 2020-07-13 ENCOUNTER — TRANSFERRED RECORDS (OUTPATIENT)
Dept: HEALTH INFORMATION MANAGEMENT | Facility: CLINIC | Age: 60
End: 2020-07-13

## 2021-01-15 ENCOUNTER — HEALTH MAINTENANCE LETTER (OUTPATIENT)
Age: 61
End: 2021-01-15

## 2021-03-03 ENCOUNTER — OFFICE VISIT (OUTPATIENT)
Dept: FAMILY MEDICINE | Facility: CLINIC | Age: 61
End: 2021-03-03
Payer: COMMERCIAL

## 2021-03-03 VITALS
HEIGHT: 69 IN | BODY MASS INDEX: 23.82 KG/M2 | SYSTOLIC BLOOD PRESSURE: 110 MMHG | RESPIRATION RATE: 16 BRPM | TEMPERATURE: 98.9 F | HEART RATE: 48 BPM | DIASTOLIC BLOOD PRESSURE: 68 MMHG | WEIGHT: 160.8 LBS | OXYGEN SATURATION: 100 %

## 2021-03-03 DIAGNOSIS — Z12.11 SPECIAL SCREENING FOR MALIGNANT NEOPLASMS, COLON: ICD-10-CM

## 2021-03-03 DIAGNOSIS — R35.0 URINARY FREQUENCY: Primary | ICD-10-CM

## 2021-03-03 DIAGNOSIS — Z12.11 SCREEN FOR COLON CANCER: ICD-10-CM

## 2021-03-03 DIAGNOSIS — Z01.818 PREOP GENERAL PHYSICAL EXAM: ICD-10-CM

## 2021-03-03 DIAGNOSIS — Z11.4 SCREENING FOR HIV (HUMAN IMMUNODEFICIENCY VIRUS): ICD-10-CM

## 2021-03-03 DIAGNOSIS — Z11.59 NEED FOR HEPATITIS C SCREENING TEST: ICD-10-CM

## 2021-03-03 DIAGNOSIS — Z23 NEED FOR TDAP VACCINATION: ICD-10-CM

## 2021-03-03 LAB
ALBUMIN UR-MCNC: NEGATIVE MG/DL
APPEARANCE UR: CLEAR
BILIRUB UR QL STRIP: NEGATIVE
COLOR UR AUTO: YELLOW
GLUCOSE UR STRIP-MCNC: NEGATIVE MG/DL
HCV AB SERPL QL IA: NONREACTIVE
HGB UR QL STRIP: NEGATIVE
HIV 1+2 AB+HIV1 P24 AG SERPL QL IA: NONREACTIVE
KETONES UR STRIP-MCNC: NEGATIVE MG/DL
LEUKOCYTE ESTERASE UR QL STRIP: NEGATIVE
NITRATE UR QL: NEGATIVE
PH UR STRIP: 7 PH (ref 5–7)
SOURCE: NORMAL
SP GR UR STRIP: 1.02 (ref 1–1.03)
UROBILINOGEN UR STRIP-ACNC: 0.2 EU/DL (ref 0.2–1)

## 2021-03-03 PROCEDURE — 87389 HIV-1 AG W/HIV-1&-2 AB AG IA: CPT | Performed by: FAMILY MEDICINE

## 2021-03-03 PROCEDURE — 36415 COLL VENOUS BLD VENIPUNCTURE: CPT | Performed by: FAMILY MEDICINE

## 2021-03-03 PROCEDURE — 99215 OFFICE O/P EST HI 40 MIN: CPT | Mod: 25 | Performed by: FAMILY MEDICINE

## 2021-03-03 PROCEDURE — G0103 PSA SCREENING: HCPCS | Performed by: FAMILY MEDICINE

## 2021-03-03 PROCEDURE — 90715 TDAP VACCINE 7 YRS/> IM: CPT | Performed by: FAMILY MEDICINE

## 2021-03-03 PROCEDURE — 81003 URINALYSIS AUTO W/O SCOPE: CPT | Performed by: FAMILY MEDICINE

## 2021-03-03 PROCEDURE — 90471 IMMUNIZATION ADMIN: CPT | Performed by: FAMILY MEDICINE

## 2021-03-03 PROCEDURE — 86803 HEPATITIS C AB TEST: CPT | Performed by: FAMILY MEDICINE

## 2021-03-03 ASSESSMENT — PATIENT HEALTH QUESTIONNAIRE - PHQ9
SUM OF ALL RESPONSES TO PHQ QUESTIONS 1-9: 0
SUM OF ALL RESPONSES TO PHQ QUESTIONS 1-9: 0

## 2021-03-03 ASSESSMENT — ANXIETY QUESTIONNAIRES
GAD7 TOTAL SCORE: 0
2. NOT BEING ABLE TO STOP OR CONTROL WORRYING: NOT AT ALL
GAD7 TOTAL SCORE: 0
7. FEELING AFRAID AS IF SOMETHING AWFUL MIGHT HAPPEN: NOT AT ALL
5. BEING SO RESTLESS THAT IT IS HARD TO SIT STILL: NOT AT ALL
1. FEELING NERVOUS, ANXIOUS, OR ON EDGE: NOT AT ALL
7. FEELING AFRAID AS IF SOMETHING AWFUL MIGHT HAPPEN: NOT AT ALL
GAD7 TOTAL SCORE: 0
3. WORRYING TOO MUCH ABOUT DIFFERENT THINGS: NOT AT ALL
4. TROUBLE RELAXING: NOT AT ALL
6. BECOMING EASILY ANNOYED OR IRRITABLE: NOT AT ALL

## 2021-03-03 ASSESSMENT — MIFFLIN-ST. JEOR: SCORE: 1516.82

## 2021-03-03 NOTE — PATIENT INSTRUCTIONS

## 2021-03-03 NOTE — PROGRESS NOTES
[address] [address]  Phone: [phone]  Primary Provider: Yan Plasencia  Pre-op Performing Provider: PAWAN LAL      PREOPERATIVE EVALUATION:  Today's date: 3/3/2021    Miguel Friend is a 60 year old male who presents for a preoperative evaluation.    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  Where patient plans to recover: At home with family  Fax number for surgical facility: [phone]    Type of Anesthesia Anticipated: to be determined    Assessment & Plan     The proposed surgical procedure is considered INTERMEDIATE risk.    Screening for HIV (human immunodeficiency virus)    - HIV Antigen Antibody Combo    Need for hepatitis C screening test    - Hepatitis C Screen Reflex to HCV RNA Quant and Genotype    Screen for colon cancer  Pt is due, will refer to Colonoscopy.    Preop general physical exam  Check below for recommendations.    Urinary frequency  Mostly related to drinking a lot of water, will check below labs, normal prostate exam  - *UA reflex to Microscopic and Culture ([place] [facility] (except [place] and [place])  - Prostate spec antigen screen    Special screening for malignant neoplasms, colon    - GASTROENTEROLOGY ADULT REF PROCEDURE ONLY; Future         Risks and Recommendations:  The patient has the following additional risks and recommendations for perioperative complications:   - No identified additional risk factors other than previously addressed        RECOMMENDATION:  APPROVAL GIVEN to proceed with proposed procedure, without further diagnostic evaluation.                      Subjective     HPI related to upcoming procedure: pt is scheduled for removal of accessory navicular bone.    Preop Questions 2/26/2021   1.  Have you ever had a heart attack or stroke? No   2. Have you ever had surgery on your heart or blood vessels, such as a stent placement, a coronary artery bypass, or surgery on an artery in your head, neck, heart, or legs? No   3. Do you have chest pain with activity? No   4. Do you have a history of  heart failure? No   5. Do you currently have a cold, bronchitis or symptoms of other infection? No   6. Do you have a cough, shortness of breath, or wheezing? No   7. Do you or anyone in your family have previous history of blood clots? No   8. Do you or does anyone in your family have a serious bleeding problem such as prolonged bleeding following surgeries or cuts? No   9. Have you ever had problems with anemia or been told to take iron pills? No   10. Have you had any abnormal blood loss such as black, tarry or bloody stools? No   11. Have you ever had a blood transfusion? No   12. Are you willing to have a blood transfusion if it is medically needed before, during, or after your surgery? Yes   13. Have you or any of your relatives ever had problems with anesthesia? No   14. Do you have sleep apnea, excessive snoring or daytime drowsiness? No   15. Do you have any artifical heart valves or other implanted medical devices like a pacemaker, defibrillator, or continuous glucose monitor? No   16. Do you have artificial joints? YES - partial knee replacement.   17. Are you allergic to latex? No     Answers for HPI/ROS submitted by the patient on 3/3/2021   PHQ9 TOTAL SCORE: 0  MARIA LUZ 7 TOTAL SCORE: 0      Health Care Directive:  Patient does not have a Health Care Directive or Living Will:     Preoperative Review of :   reviewed - no record of controlled substances prescribed.      Status of Chronic Conditions:  See problem list for active medical problems.  Problems all longstanding and stable, except as noted/documented.  See ROS for pertinent symptoms related to these conditions.    Pt has been having urinary  frequency, he is waking up 2 times a night.    Review of Systems  CONSTITUTIONAL: NEGATIVE for fever, chills, change in weight  INTEGUMENTARY/SKIN: NEGATIVE for worrisome rashes, moles or lesions  EYES: NEGATIVE for vision changes or irritation  ENT/MOUTH: NEGATIVE for ear, mouth and throat problems  RESP: NEGATIVE for significant cough or SOB  CV: NEGATIVE for chest pain, palpitations or peripheral edema  GI: NEGATIVE for nausea, abdominal pain, heartburn, or change in bowel habits   male :frequency during the night time.  MUSCULOSKELETAL:left foot pain.  NEURO: NEGATIVE for weakness, dizziness or paresthesias  ENDOCRINE: NEGATIVE for temperature intolerance, skin/hair changes  HEME: NEGATIVE for bleeding problems  PSYCHIATRIC: NEGATIVE for changes in mood or affect    Patient Active Problem List    Diagnosis Date Noted     Left knee pain 08/02/2016     Priority: Medium     Advanced directives, counseling/discussion 04/21/2015     Priority: Medium     4/21/15: Advance Care Planning invitation mailed to patient/TE         Umbilical hernia 04/13/2010     Priority: Medium     CARDIOVASCULAR SCREENING; LDL GOAL LESS THAN 160 02/10/2010     Priority: Medium      Past Medical History:   Diagnosis Date     NO ACTIVE PROBLEMS      Past Surgical History:   Procedure Laterality Date     ARTHROSCOPY KNEE RT/LT  1992    Left knee     HERNIORRHAPHY INGUINAL Right 4/17/2015    Procedure: HERNIORRHAPHY INGUINAL;  Surgeon: Sarwat Reyes MD;  Location: [facility] OR     HERNIORRHAPHY UMBILICAL N/A 4/17/2015    Procedure: HERNIORRHAPHY UMBILICAL;  Surgeon: Sarwat Reyes MD;  Location:  OR     SURGICAL HISTORY OF -   2009    partial knee replacement     TONSILLECTOMY  1980     Current Outpatient Medications   Medication Sig Dispense Refill     acetaZOLAMIDE (DIAMOX) 125 MG tablet 125 mg twice daily; beginning either the day before (preferred) or on the day of ascent; may be discontinued after staying at the same elevation for 2  "to 3 days or if descent initiated 60 tablet 0     CALCIUM ACETATE-MAGNESIUM CARB PO Take 2 capsules by mouth daily       fluticasone (FLONASE) 50 MCG/ACT spray Spray 1-2 sprays into both nostrils daily 3 Bottle 11     loratadine (CLARITIN) 10 MG tablet Take 1 tablet (10 mg) by mouth daily 90 tablet 3     Omega-3 Fatty Acids (OMEGA-3 FISH OIL PO) Take 3 tablets by mouth daily       Probiotic Product (PROBIOTIC PO)          Allergies   Allergen Reactions     No Known Drug Allergies      Yeast         Social History     Tobacco Use     Smoking status: Never Smoker     Smokeless tobacco: Never Used   Substance Use Topics     Alcohol use: Yes     Comment: 2 beers per week       History   Drug Use No         Objective     /68 (BP Location: Right arm, Patient Position: Sitting, Cuff Size: Adult Regular)   Pulse (!) 48   Temp 98.9  F (37.2  C) (Oral)   Resp 16   Ht 1.74 m (5' 8.5\")   Wt 72.9 kg (160 lb 12.8 oz)   SpO2 100%   BMI 24.09 kg/m      Physical Exam    GENERAL APPEARANCE: healthy, alert and no distress     EYES: EOMI,  PERRL     HENT: ear canals and TM's normal and nose and mouth without ulcers or lesions     NECK: no adenopathy, no asymmetry, masses, or scars and thyroid normal to palpation     RESP: lungs clear to auscultation - no rales, rhonchi or wheezes     CV: regular rates and rhythm, normal S1 S2, no S3 or S4 and no murmur, click or rub     ABDOMEN:  soft, nontender, no HSM or masses and bowel sounds normal     Rectal exam: prostate symmetric w/o nodularity, no masses palpated     MS: extremities normal- no gross deformities noted, no evidence of inflammation in joints, FROM in all extremities.     SKIN: no suspicious lesions or rashes     NEURO: Normal strength and tone, sensory exam grossly normal, mentation intact and speech normal     PSYCH: mentation appears normal. and affect normal/bright     LYMPHATICS: No cervical adenopathy    Recent Labs   Lab Test 06/27/19  1000    "   POTASSIUM 4.1   CR 0.98        Diagnostics:  Labs pending at this time.  Results will be reviewed when available.   No EKG required, no history of coronary heart disease, significant arrhythmia, peripheral arterial disease or other structural heart disease.  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  Revised Cardiac Risk Index (RCRI):  The patient has the following serious cardiovascular risks for perioperative complications:   - No serious cardiac risks = 0 points     RCRI Interpretation: 0 points: Class I (very low risk - 0.4% complication rate)             Signed Electronically by: Lawson Francois MD  Copy of this evaluation report is provided to requesting physician.    [facility] Guidelines    Revised Cardiac Risk Index

## 2021-03-04 LAB — PSA SERPL-ACNC: 0.8 UG/L (ref 0–4)

## 2021-03-04 ASSESSMENT — PATIENT HEALTH QUESTIONNAIRE - PHQ9: SUM OF ALL RESPONSES TO PHQ QUESTIONS 1-9: 0

## 2021-03-04 ASSESSMENT — ANXIETY QUESTIONNAIRES: GAD7 TOTAL SCORE: 0

## 2021-03-22 ENCOUNTER — TRANSFERRED RECORDS (OUTPATIENT)
Dept: HEALTH INFORMATION MANAGEMENT | Facility: CLINIC | Age: 61
End: 2021-03-22

## 2021-04-19 ENCOUNTER — TRANSFERRED RECORDS (OUTPATIENT)
Dept: HEALTH INFORMATION MANAGEMENT | Facility: CLINIC | Age: 61
End: 2021-04-19

## 2021-05-20 ENCOUNTER — TRANSFERRED RECORDS (OUTPATIENT)
Dept: HEALTH INFORMATION MANAGEMENT | Facility: CLINIC | Age: 61
End: 2021-05-20

## 2021-06-02 ENCOUNTER — TELEPHONE (OUTPATIENT)
Dept: PEDIATRICS | Facility: CLINIC | Age: 61
End: 2021-06-02

## 2021-06-02 NOTE — TELEPHONE ENCOUNTER
Dr. Francois-  Pt requesting orders from you (not Dr. Plasencia) for celiac screening. He has been working with [facility] on a food program to rule out sensitivities. They recommended he have this done. I did advise a visit might be necessary. He asked if I could check first. Ledy Holly, RN on 6/2/2021 at 3:29 PM

## 2021-06-02 NOTE — TELEPHONE ENCOUNTER
So sorry, just want to know what to order exactly, so it will be great to discuss it with him either over the phone or video?  Will that be possible.

## 2021-06-10 ENCOUNTER — VIRTUAL VISIT (OUTPATIENT)
Dept: FAMILY MEDICINE | Facility: CLINIC | Age: 61
End: 2021-06-10
Payer: COMMERCIAL

## 2021-06-10 DIAGNOSIS — Z91.018 WHEAT ALLERGY: Primary | ICD-10-CM

## 2021-06-10 PROCEDURE — 99213 OFFICE O/P EST LOW 20 MIN: CPT | Mod: 95 | Performed by: FAMILY MEDICINE

## 2021-06-10 NOTE — PROGRESS NOTES
Miguel is a 60 year old who is being evaluated via a billable video visit.      How would you like to obtain your AVS? MyChart  If the video visit is dropped, the invitation should be resent by: Text to cell phone: [phone]  Will anyone else be joining your video visit? No    Video Start Time: 7:15.    Assessment & Plan     Hx of wheat allergies, will check for celiac disease,     - IgA; Future  - Tissue transglutaminase david IgA and IgG; Future                   Lawson Francois MD  [facility]    Subjective   Miguel is a 60 year old who presents for the following health issues     History of Present Illness       He eats 2-3 servings of fruits and vegetables daily.He consumes 0 sweetened beverage(s) daily.He exercises with enough effort to increase his heart rate 60 or more minutes per day.  He exercises with enough effort to increase his heart rate 6 days per week.   He is taking medications regularly.       Consult: celiac panel  Pt has been having food sensitivity and has been working with [facility] nutritionist about his diet and finding the least sensitive diet for him, he was found to have sensetivity to wheat, and wonder if he has celiac disease, pt would like a test for that.    Review of Systems         Objective           Vitals:  No vitals were obtained today due to virtual visit.    Physical Exam   GENERAL: Healthy, alert and no distress                Video-Visit Details    Type of service:  Video Visit    Video End Time:7:30 AM    Originating Location (pt. Location): Home    Distant Location (provider location):  [facility]     Platform used for Video Visit: [employer]

## 2021-06-15 DIAGNOSIS — Z91.018 WHEAT ALLERGY: ICD-10-CM

## 2021-06-15 PROCEDURE — 83516 IMMUNOASSAY NONANTIBODY: CPT | Performed by: FAMILY MEDICINE

## 2021-06-15 PROCEDURE — 82784 ASSAY IGA/IGD/IGG/IGM EACH: CPT | Performed by: FAMILY MEDICINE

## 2021-06-15 PROCEDURE — 36415 COLL VENOUS BLD VENIPUNCTURE: CPT | Performed by: FAMILY MEDICINE

## 2021-06-16 LAB
IGA SERPL-MCNC: 313 MG/DL (ref 84–499)
TTG IGA SER-ACNC: 1 U/ML
TTG IGG SER-ACNC: <1 U/ML

## 2021-09-10 ENCOUNTER — TRANSFERRED RECORDS (OUTPATIENT)
Dept: HEALTH INFORMATION MANAGEMENT | Facility: CLINIC | Age: 61
End: 2021-09-10

## 2021-10-24 ENCOUNTER — HEALTH MAINTENANCE LETTER (OUTPATIENT)
Age: 61
End: 2021-10-24

## 2022-02-13 ENCOUNTER — HEALTH MAINTENANCE LETTER (OUTPATIENT)
Age: 62
End: 2022-02-13

## 2022-07-27 ENCOUNTER — OFFICE VISIT (OUTPATIENT)
Dept: FAMILY MEDICINE | Facility: CLINIC | Age: 62
End: 2022-07-27
Payer: COMMERCIAL

## 2022-07-27 VITALS
DIASTOLIC BLOOD PRESSURE: 62 MMHG | HEIGHT: 69 IN | HEART RATE: 52 BPM | WEIGHT: 168 LBS | OXYGEN SATURATION: 100 % | TEMPERATURE: 97.9 F | BODY MASS INDEX: 24.88 KG/M2 | RESPIRATION RATE: 14 BRPM | SYSTOLIC BLOOD PRESSURE: 90 MMHG

## 2022-07-27 DIAGNOSIS — H61.23 BILATERAL IMPACTED CERUMEN: ICD-10-CM

## 2022-07-27 DIAGNOSIS — F51.01 PRIMARY INSOMNIA: Primary | ICD-10-CM

## 2022-07-27 DIAGNOSIS — Z29.89 ALTITUDE SICKNESS PREVENTATIVE MEASURES: ICD-10-CM

## 2022-07-27 PROBLEM — K63.5 POLYP OF COLON: Status: ACTIVE | Noted: 2021-09-10

## 2022-07-27 PROCEDURE — 99214 OFFICE O/P EST MOD 30 MIN: CPT | Mod: 25 | Performed by: FAMILY MEDICINE

## 2022-07-27 PROCEDURE — 91305 COVID-19,PF,PFIZER (12+ YRS): CPT | Performed by: FAMILY MEDICINE

## 2022-07-27 PROCEDURE — 0054A COVID-19,PF,PFIZER (12+ YRS): CPT | Performed by: FAMILY MEDICINE

## 2022-07-27 RX ORDER — RAMELTEON 8 MG/1
8 TABLET ORAL AT BEDTIME
Qty: 90 TABLET | Refills: 1 | Status: SHIPPED | OUTPATIENT
Start: 2022-07-27 | End: 2024-06-04

## 2022-07-27 RX ORDER — ACETAZOLAMIDE 125 MG/1
TABLET ORAL
Qty: 60 TABLET | Refills: 6 | Status: SHIPPED | OUTPATIENT
Start: 2022-07-27 | End: 2024-06-04

## 2022-07-27 ASSESSMENT — PATIENT HEALTH QUESTIONNAIRE - PHQ9: SUM OF ALL RESPONSES TO PHQ QUESTIONS 1-9: 8

## 2022-07-27 NOTE — PROGRESS NOTES
"  Assessment & Plan     Primary insomnia  Discussed sleep hygiene, given CBT-I  bashir, and start on   - ramelteon (ROZEREM) 8 MG tablet; Take 1 tablet (8 mg) by mouth At Bedtime  Pt to call in 1 to 2 weeks if no improvement.     Altitude sickness preventative measures  refil given   - acetaZOLAMIDE (DIAMOX) 125 MG tablet; 125 mg twice daily; beginning either the day before (preferred) or on the day of ascent; may be discontinued after staying at the same elevation for 2 to 3 days or if descent initiated    Bilateral impacted cerumen  Cleaned using lavage.         35 minutes spent on the date of the encounter doing chart review, history and exam, documentation and further activities per the note       BMI:   Estimated body mass index is 25.17 kg/m  as calculated from the following:    Height as of this encounter: 1.74 m (5' 8.5\").    Weight as of this encounter: 76.2 kg (168 lb).           No follow-ups on file.   Follow-up Visit   Expected date:  Jul 27, 2023 (Approximate)      Follow Up Appointment Details:     Follow-up with whom?: Me    Follow-Up for what?: Adult Preventive    How?: In Person                    Lawson Francois MD  [facility] MIYA Rivera is a 61 year old, presenting for the following health issues:  Sleep Problem      History of Present Illness       Reason for visit:  Sleep issues    He eats 2-3 servings of fruits and vegetables daily.He consumes 0 sweetened beverage(s) daily.He exercises with enough effort to increase his heart rate 60 or more minutes per day.  He exercises with enough effort to increase his heart rate 6 days per week.   He is taking medications regularly.     Pt would like to have ear wash     Insomnia  Onset/Duration: On going for years   Description:   Frequency of insomnia:  nightly  Time to fall asleep (sleep latency): 5 minutes  Middle of night awakening:  YES, he has to wake up in the middle of the night to urinate but he cannot go back " "to bed, but even if he does not have to get up to urinate, he still wake up in 5 years.  Early morning awakening:  YES  Progression of Symptoms:  worsening  Accompanying Signs & Symptoms:  Daytime sleepiness/napping: YES   Excessive snoring/apnea: No  Restless legs: No  Waking to urinate: YES   Chronic pain:  No  Depression symptoms (if yes, do PHQ9): YES  Anxiety symptoms (if yes, do MARIA LUZ-7): YES  History:  Prior Insomnia: YES  New stressful situation: YES, son is struggling with depression, anxiety and OCD  Precipitating factors:    Caffeine intake: YES 1 to 2 cups in the morning.   OTC decongestants: No  Any new medications: No  Alleviating factors:  Self medicating (alcohol, etc.):  No  Stress-reduction (exercise, yoga, meditation etc): YES. Cool room, quiet, dark, and he stays off screening, no alcohol, and eating good diet and exercise.    The insomnia is affecting him, he is feeling tired, sleepy, and not functioning as well.   Family hx of sleep apnea in both dad and sister, and wonder if he has sleep apnea, but he doesn't snore.          Review of Systems         Objective    BP 90/62 (BP Location: Right arm, Patient Position: Sitting, Cuff Size: Adult Regular)   Pulse 52   Temp 97.9  F (36.6  C) (Oral)   Resp 14   Ht 1.74 m (5' 8.5\")   Wt 76.2 kg (168 lb)   SpO2 100%   BMI 25.17 kg/m    Body mass index is 25.17 kg/m .  Physical Exam   GENERAL: healthy, alert and no distress  HENT: normal cephalic/atraumatic, right ear: occluded with wax, left ear: occluded with wax, nose and mouth without ulcers or lesions, oropharynx clear and oral mucous membranes moist  NECK: no adenopathy and no asymmetry, masses, or scars                    .  ..  "

## 2022-10-16 ENCOUNTER — HEALTH MAINTENANCE LETTER (OUTPATIENT)
Age: 62
End: 2022-10-16

## 2023-03-26 ENCOUNTER — HEALTH MAINTENANCE LETTER (OUTPATIENT)
Age: 63
End: 2023-03-26

## 2024-06-01 ENCOUNTER — HEALTH MAINTENANCE LETTER (OUTPATIENT)
Age: 64
End: 2024-06-01

## 2024-06-04 ENCOUNTER — VIRTUAL VISIT (OUTPATIENT)
Dept: FAMILY MEDICINE | Facility: CLINIC | Age: 64
End: 2024-06-04
Payer: COMMERCIAL

## 2024-06-04 DIAGNOSIS — F51.01 PRIMARY INSOMNIA: Primary | ICD-10-CM

## 2024-06-04 PROCEDURE — 99213 OFFICE O/P EST LOW 20 MIN: CPT | Mod: 95 | Performed by: FAMILY MEDICINE

## 2024-06-04 NOTE — PROGRESS NOTES
Miguel is a 63 year old who is being evaluated via a billable video visit.    How would you like to obtain your AVS? MyChart  If the video visit is dropped, the invitation should be resent by: Text to cell phone: [phone]  Will anyone else be joining your video visit? No      Assessment & Plan     Primary insomnia  Pt has been struggling withi sleep for a long time, at this time, will refer to sleep specialist for evaluation and possible sleep study  - Adult Sleep Eval & Management  Referral; Future                Subjective   Miguel is a 63 year old, presenting for the following health issues:  Referral (Sleep study ), Consult For (Does have allergies, deviated septum, does have a health lifestyle, good diet ), Insomnia (Always waking up 3-5 hours after falling asleep), and Fatigue (During the day, constant )         No data to display              History of Present Illness       Reason for visit:  Need referral for a sleep study.    He eats 2-3 servings of fruits and vegetables daily.He consumes 0 sweetened beverage(s) daily.He exercises with enough effort to increase his heart rate 60 or more minutes per day.  He exercises with enough effort to increase his heart rate 6 days per week.   He is taking medications regularly.     Pt is still struggling with sleep, pt said that he is under a lot of stress, especially with his son, he also noticed that when he is exercising at night time, it makes him wakes up more often.  He falls a sleep quickly, but he wakes up in 4 to 5 hours and can't go back to sleep, but he doesn't feel refreshed.   Pt did see a therapist for his son problems.  He tried many things to help him with sleep, like medications (Ramelteon), meditation, and therapy.   Pt sometimes wakes up in the middle of the night for urination but sometimes he wakes up for no reason.            Review of Systems  Constitutional, HEENT, cardiovascular, pulmonary, gi and gu systems are negative, except as  otherwise noted.      Objective           Vitals:  No vitals were obtained today due to virtual visit.    Physical Exam   GENERAL: alert and no distress  NECK: No asymmetry, visible masses or scars  PSYCH: Appropriate affect, tone, and pace of words          Video-Visit Details    Type of service:  Video Visit   Originating Location (pt. Location): Home    Distant Location (provider location):  On-site  Platform used for Video Visit: Beba  Signed Electronically by: Lawson Francois MD

## 2024-06-06 ENCOUNTER — MYC MEDICAL ADVICE (OUTPATIENT)
Dept: FAMILY MEDICINE | Facility: CLINIC | Age: 64
End: 2024-06-06
Payer: COMMERCIAL

## 2024-06-06 DIAGNOSIS — F51.01 PRIMARY INSOMNIA: Primary | ICD-10-CM

## 2024-06-06 NOTE — TELEPHONE ENCOUNTER
Dr. Francois,    Please see [facility] message from pt and advise    LOV: 6/4/24     Thank you  Tyra Yanes RN on 6/6/2024 at 2:50 PM

## 2024-06-06 NOTE — TELEPHONE ENCOUNTER
I wonder if [facility] of neurology does sleep studies and management, can we ask them

## 2024-06-06 NOTE — TELEPHONE ENCOUNTER
Called [facility] in [place] as it is the [facility] for sleep medicine. Left a message for Rahul in scheduling to return call to writer at [phone].      -Inquiring about sleep study and management as well as how far they are scheduling out. -Will await call back.     KATHERYN Webb (Patient Advocate Liaison)  [facility]

## 2024-06-07 NOTE — TELEPHONE ENCOUNTER
Faxed referral [phone]    [facility] - [facility] [address]  [address]  [facility] [address]  Phone: [phone]  Fax: [phone]

## 2024-06-07 NOTE — TELEPHONE ENCOUNTER
[facility] clinic of  Neurology DOES complete sleep studies   Awaiting call back with availability     Teresita Gonzalez, Registered Nurse  [facility]

## 2024-06-07 NOTE — TELEPHONE ENCOUNTER
Dr. Francois- [facility] does complete sleep consultations and studies. They have availability for consultations as soon as this month.     Pended referral below.     Routed to Dr. Alessandro CAMPBELL RN   PAL (Patient Advocate Liaison)  [facility]

## 2024-06-07 NOTE — TELEPHONE ENCOUNTER
Great, here is the number, patient can call them    [facility] - [facility] [address]   [address]   [facility] [address]   Phone: [phone]   Fax: [phone]

## 2024-07-12 ENCOUNTER — TRANSFERRED RECORDS (OUTPATIENT)
Dept: HEALTH INFORMATION MANAGEMENT | Facility: CLINIC | Age: 64
End: 2024-07-12
Payer: COMMERCIAL

## 2024-07-17 ENCOUNTER — LAB (OUTPATIENT)
Dept: LAB | Facility: CLINIC | Age: 64
End: 2024-07-17
Payer: COMMERCIAL

## 2024-07-17 DIAGNOSIS — Z13.220 SCREENING FOR HYPERLIPIDEMIA: ICD-10-CM

## 2024-07-17 DIAGNOSIS — Z13.1 SCREENING FOR DIABETES MELLITUS: Primary | ICD-10-CM

## 2024-07-17 DIAGNOSIS — R53.83 FATIGUE: ICD-10-CM

## 2024-07-17 LAB
ALBUMIN SERPL BCG-MCNC: 4.3 G/DL (ref 3.5–5.2)
ALP SERPL-CCNC: 45 U/L (ref 40–150)
ALT SERPL W P-5'-P-CCNC: 27 U/L (ref 0–70)
ANION GAP SERPL CALCULATED.3IONS-SCNC: 7 MMOL/L (ref 7–15)
AST SERPL W P-5'-P-CCNC: 29 U/L (ref 0–45)
BASOPHILS # BLD AUTO: 0.1 10E3/UL (ref 0–0.2)
BASOPHILS NFR BLD AUTO: 1 %
BILIRUB SERPL-MCNC: 0.6 MG/DL
BUN SERPL-MCNC: 11.2 MG/DL (ref 8–23)
CALCIUM SERPL-MCNC: 9.3 MG/DL (ref 8.8–10.4)
CHLORIDE SERPL-SCNC: 107 MMOL/L (ref 98–107)
CHOLEST SERPL-MCNC: 189 MG/DL
CORTIS SERPL-MCNC: 11.2 UG/DL
CREAT SERPL-MCNC: 0.87 MG/DL (ref 0.67–1.17)
EGFRCR SERPLBLD CKD-EPI 2021: >90 ML/MIN/1.73M2
EOSINOPHIL # BLD AUTO: 0.3 10E3/UL (ref 0–0.7)
EOSINOPHIL NFR BLD AUTO: 6 %
ERYTHROCYTE [DISTWIDTH] IN BLOOD BY AUTOMATED COUNT: 12.2 % (ref 10–15)
FASTING STATUS PATIENT QL REPORTED: YES
FERRITIN SERPL-MCNC: 88 NG/ML (ref 31–409)
FOLATE SERPL-MCNC: 7.4 NG/ML (ref 4.6–34.8)
GLUCOSE SERPL-MCNC: 96 MG/DL (ref 70–99)
GLUCOSE SERPL-MCNC: 96 MG/DL (ref 70–99)
HCO3 SERPL-SCNC: 27 MMOL/L (ref 22–29)
HCT VFR BLD AUTO: 45 % (ref 40–53)
HDLC SERPL-MCNC: 67 MG/DL
HGB BLD-MCNC: 14.9 G/DL (ref 13.3–17.7)
IMM GRANULOCYTES # BLD: 0 10E3/UL
IMM GRANULOCYTES NFR BLD: 0 %
LDLC SERPL CALC-MCNC: 104 MG/DL
LYMPHOCYTES # BLD AUTO: 1.6 10E3/UL (ref 0.8–5.3)
LYMPHOCYTES NFR BLD AUTO: 28 %
MCH RBC QN AUTO: 29.9 PG (ref 26.5–33)
MCHC RBC AUTO-ENTMCNC: 33.1 G/DL (ref 31.5–36.5)
MCV RBC AUTO: 90 FL (ref 78–100)
MONOCYTES # BLD AUTO: 0.4 10E3/UL (ref 0–1.3)
MONOCYTES NFR BLD AUTO: 7 %
NEUTROPHILS # BLD AUTO: 3.4 10E3/UL (ref 1.6–8.3)
NEUTROPHILS NFR BLD AUTO: 59 %
NONHDLC SERPL-MCNC: 122 MG/DL
PLATELET # BLD AUTO: 241 10E3/UL (ref 150–450)
POTASSIUM SERPL-SCNC: 4 MMOL/L (ref 3.4–5.3)
PROT SERPL-MCNC: 6.7 G/DL (ref 6.4–8.3)
RBC # BLD AUTO: 4.98 10E6/UL (ref 4.4–5.9)
SODIUM SERPL-SCNC: 141 MMOL/L (ref 135–145)
TRIGL SERPL-MCNC: 91 MG/DL
TSH SERPL DL<=0.005 MIU/L-ACNC: 1.82 UIU/ML (ref 0.3–4.2)
VIT B12 SERPL-MCNC: 244 PG/ML (ref 232–1245)
VIT D+METAB SERPL-MCNC: 19 NG/ML (ref 20–50)
WBC # BLD AUTO: 5.9 10E3/UL (ref 4–11)

## 2024-07-17 PROCEDURE — 80061 LIPID PANEL: CPT

## 2024-07-17 PROCEDURE — 82728 ASSAY OF FERRITIN: CPT

## 2024-07-17 PROCEDURE — 82533 TOTAL CORTISOL: CPT

## 2024-07-17 PROCEDURE — 36415 COLL VENOUS BLD VENIPUNCTURE: CPT

## 2024-07-17 PROCEDURE — 82746 ASSAY OF FOLIC ACID SERUM: CPT

## 2024-07-17 PROCEDURE — 84443 ASSAY THYROID STIM HORMONE: CPT

## 2024-07-17 PROCEDURE — 85025 COMPLETE CBC W/AUTO DIFF WBC: CPT

## 2024-07-17 PROCEDURE — 80053 COMPREHEN METABOLIC PANEL: CPT

## 2024-07-17 PROCEDURE — 82607 VITAMIN B-12: CPT

## 2024-07-17 PROCEDURE — 82306 VITAMIN D 25 HYDROXY: CPT

## 2024-08-14 ENCOUNTER — TRANSFERRED RECORDS (OUTPATIENT)
Dept: HEALTH INFORMATION MANAGEMENT | Facility: CLINIC | Age: 64
End: 2024-08-14
Payer: COMMERCIAL

## 2024-12-22 ENCOUNTER — HEALTH MAINTENANCE LETTER (OUTPATIENT)
Age: 64
End: 2024-12-22

## 2025-02-04 ENCOUNTER — TELEPHONE (OUTPATIENT)
Dept: FAMILY MEDICINE | Facility: CLINIC | Age: 65
End: 2025-02-04
Payer: COMMERCIAL

## 2025-02-04 NOTE — TELEPHONE ENCOUNTER
Patient Quality Outreach    Patient is due for the following:   Physical Annual Wellness Visit    Action(s) Taken:   Schedule a Annual Wellness Visit    Type of outreach:    Sent [employer] message.    Questions for provider review:    None           Suyapa Rodriguez, CMA

## 2025-07-21 ENCOUNTER — TELEPHONE (OUTPATIENT)
Dept: FAMILY MEDICINE | Facility: CLINIC | Age: 65
End: 2025-07-21
Payer: COMMERCIAL

## 2025-07-21 NOTE — TELEPHONE ENCOUNTER
Patient Quality Outreach    Patient is due for the following:   Physical Annual Wellness Visit    Action(s) Taken:   Schedule a Annual Wellness Visit    Type of outreach:    Sent [employer] message.    Questions for provider review:    None         Suyapa Rodriguez, [facility]  Chart routed to None.